# Patient Record
Sex: FEMALE
[De-identification: names, ages, dates, MRNs, and addresses within clinical notes are randomized per-mention and may not be internally consistent; named-entity substitution may affect disease eponyms.]

---

## 2023-03-30 ENCOUNTER — NURSE TRIAGE (OUTPATIENT)
Dept: OTHER | Facility: CLINIC | Age: 28
End: 2023-03-30

## 2023-03-31 NOTE — TELEPHONE ENCOUNTER
Location of patient: [place]    Subjective: Caller states \"I've been stressed out and I have a really bad headache. My body is having reactions towards me being stressed. I can't really sleep. I made something to eat but I feel sick. I've been feeling like this for a couple of days but it's getting worse. Current Symptoms: headache left side towards to front     Onset:  2-3 days     Associated Symptoms: increased wakefulness    Pain Severity: 9/10; sharp; pounding;  constant    Temperature: NA     What has been tried: nap     LMP:  unknown  Pregnant: Unknown    Recommended disposition: Go to ED Now    Care advice provided, patient verbalizes understanding; denies any other questions or concerns; instructed to call back for any new or worsening symptoms. Patient/caller agrees to proceed to local Emergency Department    This triage is a result of a call to [address] [place] [address]. Please do not respond to the triage nurse through this encounter. Any subsequent communication should be directly with the patient.     Reason for Disposition   Severe pain in one eye    Protocols used: Headache-ADULT-

## 2023-06-13 LAB
CALCIDIOL (25 OH VITAMIN D3) (NG/ML) IN SER/PLAS: 15 NG/ML
HEPATITIS B VIRUS SURFACE AG PRESENCE IN SERUM: NONREACTIVE
HEPATITIS C VIRUS AB PRESENCE IN SERUM: NONREACTIVE
HIV 1/ 2 AG/AB SCREEN: NONREACTIVE
SYPHILIS TOTAL AB: NONREACTIVE

## 2023-06-14 LAB
CHLAMYDIA TRACH., AMPLIFIED: NEGATIVE
N. GONORRHEA, AMPLIFIED: NEGATIVE
TRICHOMONAS VAGINALIS: NEGATIVE

## 2023-06-16 LAB
CLUE CELLS: PRESENT
NUGENT SCORE: 7
YEAST: ABNORMAL

## 2023-09-15 LAB — CALCIDIOL (25 OH VITAMIN D3) (NG/ML) IN SER/PLAS: 28 NG/ML

## 2023-10-19 PROBLEM — N87.1 CERVICAL INTRAEPITHELIAL NEOPLASIA II: Status: ACTIVE | Noted: 2023-10-19

## 2023-10-19 PROBLEM — E55.9 VITAMIN D DEFICIENCY: Status: ACTIVE | Noted: 2023-10-19

## 2023-10-19 PROBLEM — F41.1 GAD (GENERALIZED ANXIETY DISORDER): Status: ACTIVE | Noted: 2023-10-19

## 2023-10-19 PROBLEM — M54.50 CHRONIC MIDLINE LOW BACK PAIN: Status: ACTIVE | Noted: 2023-10-19

## 2023-10-19 PROBLEM — R21 RASH OF UNKNOWN CAUSE: Status: ACTIVE | Noted: 2023-10-19

## 2023-10-19 PROBLEM — T73.0XXA: Status: ACTIVE | Noted: 2023-10-19

## 2023-10-19 PROBLEM — R87.612 PAPANICOLAOU SMEAR OF CERVIX WITH LOW GRADE SQUAMOUS INTRAEPITHELIAL LESION (LGSIL): Status: ACTIVE | Noted: 2023-10-19

## 2023-10-19 PROBLEM — G89.29 CHRONIC MIDLINE LOW BACK PAIN: Status: ACTIVE | Noted: 2023-10-19

## 2023-10-19 PROBLEM — B00.9 HSV-2 INFECTION: Status: ACTIVE | Noted: 2023-10-19

## 2023-10-19 RX ORDER — ONDANSETRON 4 MG/1
4 TABLET, ORALLY DISINTEGRATING ORAL 3 TIMES DAILY PRN
COMMUNITY
Start: 2019-12-14 | End: 2023-11-27 | Stop reason: HOSPADM

## 2023-10-19 RX ORDER — VALACYCLOVIR HYDROCHLORIDE 1 G/1
1000 TABLET, FILM COATED ORAL DAILY
COMMUNITY

## 2023-10-19 RX ORDER — CALCIUM CARBONATE 200(500)MG
1 TABLET,CHEWABLE ORAL EVERY 4 HOURS PRN
COMMUNITY
Start: 2019-09-04 | End: 2023-11-27 | Stop reason: HOSPADM

## 2023-10-19 RX ORDER — LEVONORGESTREL 52 MG/1
INTRAUTERINE DEVICE INTRAUTERINE
COMMUNITY

## 2023-10-19 RX ORDER — KETOCONAZOLE 20 MG/ML
1 SHAMPOO, SUSPENSION TOPICAL
COMMUNITY
Start: 2016-06-02 | End: 2023-12-04

## 2023-10-19 RX ORDER — MAG HYDROX/ALUMINUM HYD/SIMETH 200-200-20
SUSPENSION, ORAL (FINAL DOSE FORM) ORAL 2 TIMES DAILY
COMMUNITY
Start: 2020-09-16

## 2023-10-19 RX ORDER — FAMOTIDINE 20 MG/1
20 TABLET, FILM COATED ORAL 2 TIMES DAILY
COMMUNITY
Start: 2019-09-04 | End: 2023-11-27 | Stop reason: HOSPADM

## 2023-11-27 ENCOUNTER — OFFICE VISIT (OUTPATIENT)
Dept: OBSTETRICS AND GYNECOLOGY | Facility: CLINIC | Age: 28
End: 2023-11-27
Payer: COMMERCIAL

## 2023-11-27 VITALS — BODY MASS INDEX: 30 KG/M2 | HEART RATE: 83 BPM | WEIGHT: 164 LBS

## 2023-11-27 DIAGNOSIS — N87.1 CERVICAL INTRAEPITHELIAL NEOPLASIA II: Primary | ICD-10-CM

## 2023-11-27 LAB — PREGNANCY TEST URINE, POC: NEGATIVE

## 2023-11-27 PROCEDURE — 1036F TOBACCO NON-USER: CPT | Performed by: STUDENT IN AN ORGANIZED HEALTH CARE EDUCATION/TRAINING PROGRAM

## 2023-11-27 PROCEDURE — 57460 BX OF CERVIX W/SCOPE LEEP: CPT | Performed by: STUDENT IN AN ORGANIZED HEALTH CARE EDUCATION/TRAINING PROGRAM

## 2023-11-27 PROCEDURE — 88307 TISSUE EXAM BY PATHOLOGIST: CPT | Mod: TC,SUR | Performed by: STUDENT IN AN ORGANIZED HEALTH CARE EDUCATION/TRAINING PROGRAM

## 2023-11-27 PROCEDURE — 57460 BX OF CERVIX W/SCOPE LEEP: CPT | Mod: GC | Performed by: STUDENT IN AN ORGANIZED HEALTH CARE EDUCATION/TRAINING PROGRAM

## 2023-11-27 PROCEDURE — 88305 TISSUE EXAM BY PATHOLOGIST: CPT | Performed by: STUDENT IN AN ORGANIZED HEALTH CARE EDUCATION/TRAINING PROGRAM

## 2023-11-27 PROCEDURE — 88307 TISSUE EXAM BY PATHOLOGIST: CPT | Performed by: STUDENT IN AN ORGANIZED HEALTH CARE EDUCATION/TRAINING PROGRAM

## 2023-11-27 ASSESSMENT — PAIN SCALES - GENERAL: PAINLEVEL: 0-NO PAIN

## 2023-11-27 NOTE — PROGRESS NOTES
Subjective   Patient ID: Scout Jim is a 28 y.o. female who presents for Abnormal Pap Smear (Pt in office for LEEP/LMP/Last pap 6/13/2023 [facility]/Chaperone declined)    29 yo F presents for LEEP. Denies complaints.    Prior history as follows:  7/2023 COLPO - AKUA II @ 8 o'clock  6/2023 PAP - [facility]  2019    PAP - [facility]ILM    Smoking status: ex-smoker  Contraception: LNG IUD  HPV vaccination: yes  HIV status: is HIV negative    Physical Exam  Constitutional:       Appearance: Normal appearance. She is normal weight.   HENT:      Head: Normocephalic and atraumatic.      Right Ear: External ear normal.      Left Ear: External ear normal.      Nose: Nose normal.      Mouth/Throat:      Mouth: Mucous membranes are moist.   Eyes:      Extraocular Movements: Extraocular movements intact.   Cardiovascular:      Rate and Rhythm: Normal rate.   Pulmonary:      Effort: Pulmonary effort is normal.   Abdominal:      General: Abdomen is flat.   Genitourinary:     General: Normal vulva.      Rectum: Normal.         Musculoskeletal:         General: Normal range of motion.   Skin:     General: Skin is warm and dry.   Neurological:      General: No focal deficit present.      Mental Status: She is alert.   Psychiatric:         Mood and Affect: Mood normal.         Behavior: Behavior normal.     Colposcopy    Date/Time: 11/27/2023 1:31 PM    Performed by: Jairo Liu MD  Authorized by: Ruma Rivera MD    Consent:     Patient questions answered: yes      Risks and benefits of the procedure and its alternatives discussed: yes      Consent obtained:  Verbal    Consent given by:  Patient  Indication:     Other indication(s): clinical abnormality    Pre-procedure:     Speculum was placed in the vagina: yes      Prep solution(s): Betadine      Local anesthetic:  Lidocaine 1% w/o epi    Total amount used (mL):  20  Procedure:     Colposcopy with: LEEP      Cervix visibility: fully visualized      SCJ visibility: fully visualized   "    Lesion visualized: fully visualized      Acetowhite lesion(s): cervix      Under satisfactory analgesia the patient was prepped and draped in the dorsal lithotomy position: yes      Intracervical block was performed: yes      Speculum meets coating standards for use with lasers: yes      Electrocautery: pure cut      Cautery loop used: yes      Cautery loop depth of excision (mm):  12    Cautery loop radius of excision (mm):  12    Top Hat performed: no      Endometrial sampling: curettage      Ball cautery with coag current performed: no      LEEP details:  LEEP performed with IUD in place. First segment (Labeled \"1\" in Physical exam above) taken from lateral edge of right cervix with care taken to include prior biopsy site. Excision was terminated upon reaching IUD strings at 1 o'clock. IUD strings were then swept out of the way and the excision completed.    An endocervical curettage was then performed.    Ferric subsulfate solution applied: yes    Post-procedure:     Patient tolerance of procedure:  Patient tolerated the procedure well with no immediate complications    Assessment/Plan  LEEP obtained without complication   Colposcopic impression:   Joshua index  Color 1  Margins 0  Vessels 1  Iodine staining 0  Total 1, consistent with AKUA I  Follow-up surgical path    Seen and discussed with Dr. Miguel Liu MD  Obstetrics and Gynecology  "

## 2023-12-04 ENCOUNTER — OFFICE VISIT (OUTPATIENT)
Dept: OBSTETRICS AND GYNECOLOGY | Facility: CLINIC | Age: 28
End: 2023-12-04
Payer: COMMERCIAL

## 2023-12-04 VITALS
HEART RATE: 88 BPM | HEIGHT: 61 IN | DIASTOLIC BLOOD PRESSURE: 57 MMHG | BODY MASS INDEX: 31.26 KG/M2 | WEIGHT: 165.6 LBS | SYSTOLIC BLOOD PRESSURE: 109 MMHG

## 2023-12-04 DIAGNOSIS — N87.1 CERVICAL INTRAEPITHELIAL NEOPLASIA II: Primary | ICD-10-CM

## 2023-12-04 PROCEDURE — 99212 OFFICE O/P EST SF 10 MIN: CPT | Mod: GC | Performed by: STUDENT IN AN ORGANIZED HEALTH CARE EDUCATION/TRAINING PROGRAM

## 2023-12-04 PROCEDURE — 99212 OFFICE O/P EST SF 10 MIN: CPT | Performed by: STUDENT IN AN ORGANIZED HEALTH CARE EDUCATION/TRAINING PROGRAM

## 2023-12-04 PROCEDURE — 1036F TOBACCO NON-USER: CPT | Performed by: STUDENT IN AN ORGANIZED HEALTH CARE EDUCATION/TRAINING PROGRAM

## 2023-12-04 RX ORDER — TRIAMCINOLONE ACETONIDE 1 MG/G
CREAM TOPICAL
COMMUNITY
Start: 2023-10-04

## 2023-12-04 RX ORDER — CLOTRIMAZOLE 1 %
CREAM (GRAM) TOPICAL
COMMUNITY
Start: 2023-09-29

## 2023-12-04 RX ORDER — METRONIDAZOLE 7.5 MG/G
GEL VAGINAL
COMMUNITY
Start: 2022-05-11 | End: 2023-12-07 | Stop reason: ALTCHOICE

## 2023-12-04 ASSESSMENT — ENCOUNTER SYMPTOMS
PSYCHIATRIC NEGATIVE: 0
GASTROINTESTINAL NEGATIVE: 0
MUSCULOSKELETAL NEGATIVE: 0
CARDIOVASCULAR NEGATIVE: 0
EYES NEGATIVE: 0
NEUROLOGICAL NEGATIVE: 0
RESPIRATORY NEGATIVE: 0
CONSTITUTIONAL NEGATIVE: 0
ALLERGIC/IMMUNOLOGIC NEGATIVE: 0
ENDOCRINE NEGATIVE: 0
HEMATOLOGIC/LYMPHATIC NEGATIVE: 0

## 2023-12-04 ASSESSMENT — PAIN SCALES - GENERAL: PAINLEVEL: 8

## 2023-12-04 NOTE — PROGRESS NOTES
Subjective   Patient ID: Scout Jim is a 28 y.o. female who presents for FOLLOW UP FROM LEEP (Patient states her lower back and abdominal are hurting, patient decline the flu and Covid shots this visit. No sti checking today.  )    Feeling some cramping, only needing to take tylenol occasionally. Some brown discharge. Overall doing well, no concerns     Prior history as follows:  7/2023 COLPO - AKUA II @ 8 o'clock  6/2023 PAP - [facility]  2019    PAP - NILM    Smoking status: ex-smoker  Contraception: LNG IUD  HPV vaccination: yes  HIV status: is HIV negative    Review of Systems   All other systems reviewed and are negative.     Gen: NAD  HEENT: NCAT  Resp: normal work of breathing on room air  Card: regular rate  Neuro: grossly intact   Psych: approipriate  Motor: moving all extremities spontaneously   Abdomen: Non distended      Assessment/Plan   LEEP follow up  - recovering well  - RTC in 4 weeks for exam and discussion of path results     Pt seen and dw Dr Miguel Quiñonez MD  OB/GYN PGY3

## 2023-12-04 NOTE — PROGRESS NOTES
I saw and evaluated the patient. I personally obtained the key and critical portions of the history and physical exam or was physically present for key and critical portions performed by the resident/fellow. I reviewed the resident/fellow's documentation and discussed the patient with the resident/fellow. I agree with the resident/fellow's medical decision making as documented in the note.    Ruma Rivera MD

## 2023-12-05 LAB
LABORATORY COMMENT REPORT: NORMAL
PATH REPORT.FINAL DX SPEC: NORMAL
PATH REPORT.GROSS SPEC: NORMAL
PATH REPORT.RELEVANT HX SPEC: NORMAL
PATH REPORT.TOTAL CANCER: NORMAL

## 2023-12-07 ENCOUNTER — TELEPHONE (OUTPATIENT)
Dept: OBSTETRICS AND GYNECOLOGY | Facility: CLINIC | Age: 28
End: 2023-12-07
Payer: COMMERCIAL

## 2023-12-07 ENCOUNTER — OFFICE VISIT (OUTPATIENT)
Dept: OBSTETRICS AND GYNECOLOGY | Facility: CLINIC | Age: 28
End: 2023-12-07
Payer: COMMERCIAL

## 2023-12-07 VITALS
WEIGHT: 165.9 LBS | DIASTOLIC BLOOD PRESSURE: 69 MMHG | HEART RATE: 97 BPM | SYSTOLIC BLOOD PRESSURE: 137 MMHG | BODY MASS INDEX: 31.35 KG/M2

## 2023-12-07 DIAGNOSIS — B96.89 BACTERIAL VAGINOSIS: Primary | ICD-10-CM

## 2023-12-07 DIAGNOSIS — N76.0 BACTERIAL VAGINOSIS: Primary | ICD-10-CM

## 2023-12-07 DIAGNOSIS — Z98.890 STATUS POST LEEP (LOOP ELECTROSURGICAL EXCISION PROCEDURE) OF CERVIX: ICD-10-CM

## 2023-12-07 DIAGNOSIS — N93.9 VAGINAL BLEEDING: Primary | ICD-10-CM

## 2023-12-07 PROCEDURE — 1036F TOBACCO NON-USER: CPT | Performed by: STUDENT IN AN ORGANIZED HEALTH CARE EDUCATION/TRAINING PROGRAM

## 2023-12-07 PROCEDURE — 99214 OFFICE O/P EST MOD 30 MIN: CPT | Mod: GC | Performed by: STUDENT IN AN ORGANIZED HEALTH CARE EDUCATION/TRAINING PROGRAM

## 2023-12-07 PROCEDURE — 99214 OFFICE O/P EST MOD 30 MIN: CPT | Performed by: STUDENT IN AN ORGANIZED HEALTH CARE EDUCATION/TRAINING PROGRAM

## 2023-12-07 RX ORDER — METRONIDAZOLE 500 MG/1
500 TABLET ORAL 2 TIMES DAILY
Qty: 10 TABLET | Refills: 0 | Status: SHIPPED | OUTPATIENT
Start: 2023-12-07 | End: 2023-12-12

## 2023-12-07 ASSESSMENT — ENCOUNTER SYMPTOMS
ENDOCRINE NEGATIVE: 0
NEUROLOGICAL NEGATIVE: 0
RESPIRATORY NEGATIVE: 0
HEMATOLOGIC/LYMPHATIC NEGATIVE: 0
PSYCHIATRIC NEGATIVE: 0
CARDIOVASCULAR NEGATIVE: 0
GASTROINTESTINAL NEGATIVE: 0
CONSTITUTIONAL NEGATIVE: 0
ALLERGIC/IMMUNOLOGIC NEGATIVE: 0
MUSCULOSKELETAL NEGATIVE: 0
EYES NEGATIVE: 0

## 2023-12-07 ASSESSMENT — PAIN SCALES - GENERAL: PAINLEVEL: 0-NO PAIN

## 2023-12-07 NOTE — PROGRESS NOTES
Subjective   Patient ID: Scout Jim is a 28 y.o. female who presents for Vaginal Bleeding (Patient been having some light headiness.no sti checking. Patient decline flu and covid shot this visit.  ).  Vaginal Bleeding     Patient presenting for VB s/p LEEP procedure on 11/27. Had had minimal bleeding up until 2 days ago when she started passing clots in the toilet. State prior to the LEEP her VB had been minimal with IUD in place. Denies HA dizziness, fevers or chills    Review of Systems   Genitourinary:  Positive for vaginal bleeding.   All other systems reviewed and are negative.      Objective   Physical Exam  Vitals and nursing note reviewed.   Constitutional:       Appearance: Normal appearance.   HENT:      Head: Normocephalic and atraumatic.      Nose: Nose normal.      Mouth/Throat:      Mouth: Mucous membranes are moist.   Eyes:      Extraocular Movements: Extraocular movements intact.      Conjunctiva/sclera: Conjunctivae normal.   Pulmonary:      Effort: Pulmonary effort is normal.   Chest:      Chest wall: No deformity or swelling.   Breasts:     Breasts are symmetrical.      Right: Normal.      Left: Normal.   Abdominal:      General: There is no distension.      Palpations: Abdomen is soft. There is no mass.      Tenderness: There is no abdominal tenderness.   Genitourinary:     General: Normal vulva.      Vagina: Normal.      Cervix: Normal.      Uterus: Normal.       Adnexa: Right adnexa normal and left adnexa normal.      Rectum: Normal.      Comments: 3cc dark red clot near cervical os, IUD strings visualized  Musculoskeletal:      Cervical back: Normal range of motion.   Skin:     General: Skin is warm and dry.   Neurological:      General: No focal deficit present.      Mental Status: She is alert.   Psychiatric:         Mood and Affect: Mood normal.         Behavior: Behavior normal.         Thought Content: Thought content normal.         Judgment: Judgment normal.          Assessment/Plan   Vaginal Bleeding  -s/p LEEP  -Additional Monsel solution applied to the LEEP with improvement in bleeding  -Continue to monitor, precautions provided    D/w Dr. Jr Hadley MD PGY-3

## 2023-12-07 NOTE — TELEPHONE ENCOUNTER
----- Message from Scout Smyth sent at 12/7/2023 12:14 AM EST -----  Regarding: urgent colposcopy question:  Contact: [phone]  Hi, good afternoon my name is Scout smyth. And I’ve recently had a Colposcopy procedure done, and I’m having trouble with extreme foul odor. And I feel uncomfortable when I go out into public to do normal activities due to my cervix being in the healing process with brown and yellow color discharge already. I’ve also had bacteria vaginosis before the procedure, around that time I was taking metronidazole and I believe it has came back. Is there any luck as far as a biotic to help? I’ve also have spoken with a nurse to see if I am able to get any thing to help. She said she will reach out to my doctor and then she will call me back with an update. My phone number is [phone]. I have a new pharmacy as well. Thank you.

## 2023-12-07 NOTE — TELEPHONE ENCOUNTER
Pt calling stating her bleeding has increased and she is on mirena and usually doesn't have anything but spotting. Pt denies having sex or using anything in vagina. Pt states she went through 3 panti liners in last hour and also passed a few quarter size clots. Discussed with Dr Hadley and he said to add her to his schedule. Pt given apt for 3 pm

## 2023-12-07 NOTE — TELEPHONE ENCOUNTER
Spoke to pt yesterday and sent message to Dr Rivera about pt requesting metronidazole. Dr Rivera sent prescription and Pt notified that prescription sent

## 2023-12-08 NOTE — PROGRESS NOTES
I saw and evaluated the patient. I personally obtained the key and critical portions of the history and physical exam or was physically present for key and critical portions performed by the resident/fellow. I reviewed the resident/fellow's documentation and discussed the patient with the resident/fellow. I agree with the resident/fellow's medical decision making as documented in the note.    Katina Norris MD

## 2023-12-13 ENCOUNTER — TELEPHONE (OUTPATIENT)
Dept: OBSTETRICS AND GYNECOLOGY | Facility: CLINIC | Age: 28
End: 2023-12-13
Payer: COMMERCIAL

## 2023-12-13 NOTE — TELEPHONE ENCOUNTER
----- Message from Garima Levi MD sent at 12/11/2023  2:56 PM EST -----  6 month follow up, can you notify patient

## 2023-12-27 ENCOUNTER — TELEPHONE (OUTPATIENT)
Dept: OBSTETRICS AND GYNECOLOGY | Facility: CLINIC | Age: 28
End: 2023-12-27
Payer: COMMERCIAL

## 2023-12-27 NOTE — TELEPHONE ENCOUNTER
PT is being treated for BV and asking is she could use the gel  due to difficulty of taking pills but once pt opened the  pill container up and saw the size of the pill she sais she could take them.  Advised pt try take the pill with applesauce  and if unable to take it to call  back, Rx was sent 12/7/23

## 2024-01-09 ENCOUNTER — TELEPHONE (OUTPATIENT)
Dept: OBSTETRICS AND GYNECOLOGY | Facility: CLINIC | Age: 29
End: 2024-01-09
Payer: COMMERCIAL

## 2024-01-09 NOTE — TELEPHONE ENCOUNTER
Pt rescheduled for missed leep follow up pt called yesterday to reschedule as she was not feeling well. She reports swollen lymph nodes today. Advised she may be seen for urgent care visit if needed and pt is rescheduled for next Monday

## 2024-01-15 ENCOUNTER — APPOINTMENT (OUTPATIENT)
Dept: OBSTETRICS AND GYNECOLOGY | Facility: CLINIC | Age: 29
End: 2024-01-15
Payer: COMMERCIAL

## 2024-02-22 ENCOUNTER — OFFICE VISIT (OUTPATIENT)
Dept: OBSTETRICS AND GYNECOLOGY | Facility: CLINIC | Age: 29
End: 2024-02-22
Payer: COMMERCIAL

## 2024-02-22 VITALS
BODY MASS INDEX: 31.53 KG/M2 | SYSTOLIC BLOOD PRESSURE: 110 MMHG | WEIGHT: 167 LBS | HEIGHT: 61 IN | DIASTOLIC BLOOD PRESSURE: 70 MMHG

## 2024-02-22 DIAGNOSIS — Z11.3 SCREEN FOR STD (SEXUALLY TRANSMITTED DISEASE): ICD-10-CM

## 2024-02-22 DIAGNOSIS — N76.0 ACUTE VAGINITIS: Primary | ICD-10-CM

## 2024-02-22 LAB
POC CLUE CELL WET PREP: NORMAL
POC TRICHOMONAS WET PREP: NORMAL
POC WBC WET PREP: NORMAL
POC YEAST CELLS WET PREP: NORMAL

## 2024-02-22 PROCEDURE — 99214 OFFICE O/P EST MOD 30 MIN: CPT | Performed by: OBSTETRICS & GYNECOLOGY

## 2024-02-22 PROCEDURE — 87800 DETECT AGNT MULT DNA DIREC: CPT

## 2024-02-22 PROCEDURE — 87661 TRICHOMONAS VAGINALIS AMPLIF: CPT

## 2024-02-22 PROCEDURE — 87210 SMEAR WET MOUNT SALINE/INK: CPT | Performed by: OBSTETRICS & GYNECOLOGY

## 2024-02-22 PROCEDURE — 1036F TOBACCO NON-USER: CPT | Performed by: OBSTETRICS & GYNECOLOGY

## 2024-02-22 PROCEDURE — 87205 SMEAR GRAM STAIN: CPT

## 2024-02-22 RX ORDER — METRONIDAZOLE 500 MG/1
500 TABLET ORAL 2 TIMES DAILY
Qty: 14 TABLET | Refills: 0 | Status: SHIPPED | OUTPATIENT
Start: 2024-02-22 | End: 2024-02-29

## 2024-02-22 ASSESSMENT — PAIN SCALES - GENERAL: PAINLEVEL: 0-NO PAIN

## 2024-02-22 NOTE — PROGRESS NOTES
Subjective   Patient ID: Scout Jim is a 28 y.o. female who presents for Vaginitis/Bacterial Vaginosis (Vaginal odor ).  Patient here complaining of 1 week history of vaginal irritation and vaginal odor.  Trace clear discharge.  Denies any external lesions.  Patient sexually active with steady partner.  Denies nausea vomiting fever chills no GI or  complaints    Review of Systems  All systems negative except for vaginal discharge and vaginal odor    Objective   Physical Exam  Vitals reviewed.   Constitutional:       Appearance: Normal appearance. She is normal weight.   Cardiovascular:      Rate and Rhythm: Normal rate and regular rhythm.   Pulmonary:      Effort: Pulmonary effort is normal.      Breath sounds: Normal breath sounds.   Abdominal:      General: Abdomen is flat. Bowel sounds are normal.      Palpations: Abdomen is soft.   Genitourinary:     Comments: External genitalia unremarkable no lesions  Vagina shows trace discharge wet mount positive clue cells  Cervix uterus normal  Adnexa unremarkable  Perineum without lesions or inflammation  Neurological:      General: No focal deficit present.      Mental Status: She is alert.   Psychiatric:         Mood and Affect: Mood normal.         Behavior: Behavior normal.         Thought Content: Thought content normal.         Judgment: Judgment normal.         Assessment/Plan   Diagnoses and all orders for this visit:  Acute vaginitis  -     C. Trachomatis / N. Gonorrhoeae, Amplified Detection  -     Hepatitis B Surface Antigen; Future  -     Hepatitis C Antibody; Future  -     HIV 1/2 Antigen/Antibody Screen with Reflex to Confirmation; Future  -     Syphilis Screen with Reflex; Future  -     Trichomonas vaginalis, Nucleic Acid Detection  -     Vaginitis Gram Stain For Bacterial Vaginosis + Yeast  -     metroNIDAZOLE (Flagyl) 500 mg tablet; Take 1 tablet (500 mg) by mouth 2 times a day for 7 days.  -     POCT Wet Mount manually resulted  Screen for STD  (sexually transmitted disease)  -     C. Trachomatis / N. Gonorrhoeae, Amplified Detection  -     Hepatitis B Surface Antigen; Future  -     Hepatitis C Antibody; Future  -     HIV 1/2 Antigen/Antibody Screen with Reflex to Confirmation; Future  -     Syphilis Screen with Reflex; Future  -     Trichomonas vaginalis, Nucleic Acid Detection  -     Vaginitis Gram Stain For Bacterial Vaginosis + Yeast  -     metroNIDAZOLE (Flagyl) 500 mg tablet; Take 1 tablet (500 mg) by mouth 2 times a day for 7 days.  -     POCT Wet Mount manually resulted     Will start patient on Flagyl for now.  Wet mount seems to indicate probable bacterial vaginitis.  Will reevaluate when cultures are back.  Chart reviewed    James Brooke MD 02/22/24 2:14 PM

## 2024-02-23 ENCOUNTER — OFFICE VISIT (OUTPATIENT)
Dept: OBSTETRICS AND GYNECOLOGY | Facility: CLINIC | Age: 29
End: 2024-02-23
Payer: COMMERCIAL

## 2024-02-23 ENCOUNTER — TELEPHONE (OUTPATIENT)
Dept: OBSTETRICS AND GYNECOLOGY | Facility: CLINIC | Age: 29
End: 2024-02-23
Payer: COMMERCIAL

## 2024-02-23 DIAGNOSIS — A54.9 GONORRHEA: ICD-10-CM

## 2024-02-23 LAB
BACTERIAL VAGINOSIS VAG-IMP: ABNORMAL
C TRACH RRNA SPEC QL NAA+PROBE: NEGATIVE
CLUE CELLS VAG LPF-#/AREA: PRESENT /[LPF]
N GONORRHOEA DNA SPEC QL PROBE+SIG AMP: POSITIVE
NUGENT SCORE: 6
T VAGINALIS RRNA SPEC QL NAA+PROBE: NEGATIVE
YEAST VAG WET PREP-#/AREA: ABNORMAL

## 2024-02-23 PROCEDURE — 1036F TOBACCO NON-USER: CPT | Performed by: ADVANCED PRACTICE MIDWIFE

## 2024-02-23 PROCEDURE — 96372 THER/PROPH/DIAG INJ SC/IM: CPT | Performed by: ADVANCED PRACTICE MIDWIFE

## 2024-02-23 PROCEDURE — 99212 OFFICE O/P EST SF 10 MIN: CPT | Performed by: ADVANCED PRACTICE MIDWIFE

## 2024-02-23 RX ORDER — CEFTRIAXONE 500 MG/1
500 INJECTION, POWDER, FOR SOLUTION INTRAMUSCULAR; INTRAVENOUS ONCE
Status: COMPLETED | OUTPATIENT
Start: 2024-02-23 | End: 2024-02-23

## 2024-02-23 RX ADMIN — CEFTRIAXONE 500 MG: 500 INJECTION, POWDER, FOR SOLUTION INTRAMUSCULAR; INTRAVENOUS at 14:15

## 2024-02-23 NOTE — RESULT ENCOUNTER NOTE
Positive gonorrhea.  Needs to come in to the office for Rocephin 500 mg IM.  She should advise her partner to seek treatment.

## 2024-02-23 NOTE — TELEPHONE ENCOUNTER
Name/ verified  Called and notified Pt that she has been diagnosed with a sexually transmitted infection Gonorrhea.    Pt needs to come to office for Rocephin injection. Scheduled for today at 1:15pm  Encouraged partner treatment.  Please use condoms to prevent STIs.  Pt verbalized understanding.

## 2024-02-23 NOTE — PROGRESS NOTES
Pt is here for Ceftriaxone injection.  Ceftriaxone 500 mg was mixed with 1.8ml NS and given IM in right ventro gluteal for +Gonorrhea  Lot # [id number], Exp date 7/2025 NDC 5178-0449-34  Pt tolerated well.  Patient was instructed to abstain from intercourse for 7-10 days  and until after partner is treated. STD prevention discussed.   Provider Yasemin Torres CNM was present in office at the time of the injection.  Denies any further questions and pt verbalized understanding.

## 2024-05-24 ENCOUNTER — APPOINTMENT (OUTPATIENT)
Dept: OBSTETRICS AND GYNECOLOGY | Facility: CLINIC | Age: 29
End: 2024-05-24
Payer: COMMERCIAL

## 2024-05-31 ENCOUNTER — APPOINTMENT (OUTPATIENT)
Dept: OBSTETRICS AND GYNECOLOGY | Facility: HOSPITAL | Age: 29
End: 2024-05-31
Payer: COMMERCIAL

## 2024-06-04 ENCOUNTER — APPOINTMENT (OUTPATIENT)
Dept: OBSTETRICS AND GYNECOLOGY | Facility: CLINIC | Age: 29
End: 2024-06-04
Payer: COMMERCIAL

## 2024-06-05 ENCOUNTER — APPOINTMENT (OUTPATIENT)
Dept: OBSTETRICS AND GYNECOLOGY | Facility: CLINIC | Age: 29
End: 2024-06-05
Payer: COMMERCIAL

## 2024-06-06 ENCOUNTER — APPOINTMENT (OUTPATIENT)
Dept: OBSTETRICS AND GYNECOLOGY | Facility: HOSPITAL | Age: 29
End: 2024-06-06
Payer: COMMERCIAL

## 2024-06-06 ENCOUNTER — PROCEDURE VISIT (OUTPATIENT)
Dept: OBSTETRICS AND GYNECOLOGY | Facility: CLINIC | Age: 29
End: 2024-06-06
Payer: COMMERCIAL

## 2024-06-06 VITALS
BODY MASS INDEX: 32.36 KG/M2 | SYSTOLIC BLOOD PRESSURE: 142 MMHG | DIASTOLIC BLOOD PRESSURE: 74 MMHG | HEIGHT: 61 IN | WEIGHT: 171.4 LBS

## 2024-06-06 DIAGNOSIS — Z30.432 ENCOUNTER FOR IUD REMOVAL: Primary | ICD-10-CM

## 2024-06-06 DIAGNOSIS — Z30.431 INTRAUTERINE DEVICE SURVEILLANCE: ICD-10-CM

## 2024-06-06 DIAGNOSIS — N87.1 CIN II (CERVICAL INTRAEPITHELIAL NEOPLASIA II): ICD-10-CM

## 2024-06-06 PROCEDURE — 99213 OFFICE O/P EST LOW 20 MIN: CPT | Performed by: STUDENT IN AN ORGANIZED HEALTH CARE EDUCATION/TRAINING PROGRAM

## 2024-06-06 ASSESSMENT — ENCOUNTER SYMPTOMS
MUSCULOSKELETAL NEGATIVE: 0
ENDOCRINE NEGATIVE: 0
HEMATOLOGIC/LYMPHATIC NEGATIVE: 0
PSYCHIATRIC NEGATIVE: 0
RESPIRATORY NEGATIVE: 0
CONSTITUTIONAL NEGATIVE: 0
GASTROINTESTINAL NEGATIVE: 0
EYES NEGATIVE: 0
CARDIOVASCULAR NEGATIVE: 0
NEUROLOGICAL NEGATIVE: 0
ALLERGIC/IMMUNOLOGIC NEGATIVE: 0

## 2024-06-06 ASSESSMENT — PATIENT HEALTH QUESTIONNAIRE - PHQ9
SUM OF ALL RESPONSES TO PHQ9 QUESTIONS 1 AND 2: 0
2. FEELING DOWN, DEPRESSED OR HOPELESS: NOT AT ALL
1. LITTLE INTEREST OR PLEASURE IN DOING THINGS: NOT AT ALL

## 2024-06-06 ASSESSMENT — PAIN SCALES - GENERAL: PAINLEVEL: 0-NO PAIN

## 2024-06-18 ENCOUNTER — APPOINTMENT (OUTPATIENT)
Dept: OBSTETRICS AND GYNECOLOGY | Facility: CLINIC | Age: 29
End: 2024-06-18
Payer: COMMERCIAL

## 2024-06-18 DIAGNOSIS — Z31.69 PRE-CONCEPTION COUNSELING: Primary | ICD-10-CM

## 2024-06-18 PROCEDURE — 99212 OFFICE O/P EST SF 10 MIN: CPT | Performed by: STUDENT IN AN ORGANIZED HEALTH CARE EDUCATION/TRAINING PROGRAM

## 2024-06-18 ASSESSMENT — ENCOUNTER SYMPTOMS
RESPIRATORY NEGATIVE: 0
ENDOCRINE NEGATIVE: 0
DYSURIA: 0
FLATUS: 0
HEADACHES: 0
ALLERGIC/IMMUNOLOGIC NEGATIVE: 0
VOMITING: 0
ARTHRALGIAS: 0
DIARRHEA: 0
HEMATURIA: 0
WEIGHT LOSS: 0
HEMATOLOGIC/LYMPHATIC NEGATIVE: 0
MYALGIAS: 0
FREQUENCY: 0
ABDOMINAL PAIN: 1
PSYCHIATRIC NEGATIVE: 0
ANOREXIA: 0
MUSCULOSKELETAL NEGATIVE: 0
EYES NEGATIVE: 0
CONSTITUTIONAL NEGATIVE: 0
HEMATOCHEZIA: 0
FEVER: 0
CARDIOVASCULAR NEGATIVE: 0
NEUROLOGICAL NEGATIVE: 0
CONSTIPATION: 0
NAUSEA: 0
BELCHING: 0
GASTROINTESTINAL NEGATIVE: 0

## 2024-06-18 NOTE — PROGRESS NOTES
Subjective   Patient ID: Scout Jim is a 28 y.o. female who presents for Abdominal Pain (Abdominal pain and to discuss contraceptive last pap 6/6/24 wnl).  Patient here for counseling about achieve conception.  No acute complaints.        Review of Systems   All other systems reviewed and are negative.      Objective   Physical Exam  Vitals reviewed.   Constitutional:       General: She is not in acute distress.     Appearance: She is not ill-appearing.   Pulmonary:      Effort: Pulmonary effort is normal.   Skin:     Coloration: Skin is not pale.   Neurological:      Mental Status: She is alert.         Assessment/Plan   Diagnoses and all orders for this visit:  Pre-conception counseling    Here for preconception counseling.  Educated patient about ovulation predictor kits and timed intercourse.  Patient to follow-up as scheduled       Garcia Velazquez MD 06/18/24 4:55 PM

## 2024-06-24 ENCOUNTER — APPOINTMENT (OUTPATIENT)
Dept: OBSTETRICS AND GYNECOLOGY | Facility: CLINIC | Age: 29
End: 2024-06-24
Payer: COMMERCIAL

## 2024-06-26 PROCEDURE — 58301 REMOVE INTRAUTERINE DEVICE: CPT | Performed by: STUDENT IN AN ORGANIZED HEALTH CARE EDUCATION/TRAINING PROGRAM

## 2024-06-26 NOTE — PROGRESS NOTES
Patient ID: Scout Jim is a 28 y.o. female.    IUD Removal    Date/Time: 6/26/2024 5:36 PM    Performed by: Garcia Velazquez MD  Authorized by: Garcia Velazquez MD    Consent:     Consent obtained:  Verbal and written    Consent given by:  Patient    Procedure risks and benefits discussed: yes      Patient questions answered: yes      Patient agrees, verbalizes understanding, and wants to proceed: yes    Procedure:     Removed with no complications: yes

## 2024-07-03 ENCOUNTER — APPOINTMENT (OUTPATIENT)
Dept: OBSTETRICS AND GYNECOLOGY | Facility: CLINIC | Age: 29
End: 2024-07-03
Payer: COMMERCIAL

## 2024-07-05 ENCOUNTER — APPOINTMENT (OUTPATIENT)
Dept: OBSTETRICS AND GYNECOLOGY | Facility: CLINIC | Age: 29
End: 2024-07-05
Payer: COMMERCIAL

## 2024-07-26 ENCOUNTER — TELEPHONE (OUTPATIENT)
Dept: OBSTETRICS AND GYNECOLOGY | Facility: CLINIC | Age: 29
End: 2024-07-26

## 2024-07-26 ENCOUNTER — APPOINTMENT (OUTPATIENT)
Dept: OBSTETRICS AND GYNECOLOGY | Facility: CLINIC | Age: 29
End: 2024-07-26
Payer: COMMERCIAL

## 2024-07-31 DIAGNOSIS — Z30.432 ENCOUNTER FOR IUD REMOVAL: ICD-10-CM

## 2024-08-21 ENCOUNTER — APPOINTMENT (OUTPATIENT)
Dept: OBSTETRICS AND GYNECOLOGY | Facility: CLINIC | Age: 29
End: 2024-08-21
Payer: COMMERCIAL

## 2024-08-21 VITALS
HEIGHT: 61 IN | DIASTOLIC BLOOD PRESSURE: 64 MMHG | WEIGHT: 173 LBS | SYSTOLIC BLOOD PRESSURE: 123 MMHG | BODY MASS INDEX: 32.66 KG/M2

## 2024-08-21 DIAGNOSIS — Z34.90 PREGNANCY WITH GESTATION OF UNKNOWN DURATION (HHS-HCC): Primary | ICD-10-CM

## 2024-08-21 PROCEDURE — 87086 URINE CULTURE/COLONY COUNT: CPT

## 2024-08-21 PROCEDURE — 87591 N.GONORRHOEAE DNA AMP PROB: CPT

## 2024-08-21 PROCEDURE — 3008F BODY MASS INDEX DOCD: CPT | Performed by: STUDENT IN AN ORGANIZED HEALTH CARE EDUCATION/TRAINING PROGRAM

## 2024-08-21 PROCEDURE — 1036F TOBACCO NON-USER: CPT | Performed by: STUDENT IN AN ORGANIZED HEALTH CARE EDUCATION/TRAINING PROGRAM

## 2024-08-21 PROCEDURE — 87661 TRICHOMONAS VAGINALIS AMPLIF: CPT

## 2024-08-21 PROCEDURE — 87491 CHLMYD TRACH DNA AMP PROBE: CPT

## 2024-08-21 PROCEDURE — 99213 OFFICE O/P EST LOW 20 MIN: CPT | Performed by: STUDENT IN AN ORGANIZED HEALTH CARE EDUCATION/TRAINING PROGRAM

## 2024-08-21 RX ORDER — CALCIUM CARB/VITAMIN D3/VIT K1 500-500-40
800 TABLET,CHEWABLE ORAL DAILY
Qty: 180 CAPSULE | Refills: 3 | Status: SHIPPED | OUTPATIENT
Start: 2024-08-21 | End: 2025-08-21

## 2024-08-21 ASSESSMENT — ENCOUNTER SYMPTOMS
DEPRESSION: 0
OCCASIONAL FEELINGS OF UNSTEADINESS: 0
LOSS OF SENSATION IN FEET: 0

## 2024-08-21 ASSESSMENT — PAIN SCALES - GENERAL: PAINLEVEL: 0-NO PAIN

## 2024-08-21 NOTE — PROGRESS NOTES
Subjective   Patient ID [id number]   Scout Jim is a 28 y.o.  at Unknown with a working estimated date of delivery of Not found. who presents for an initial prenatal visit. This pregnancy is planned and desired .    Her pregnancy is complicated by:  Hx of blood transfusion 2/2 AUB as a teen    OB History    Para Term  AB Living   2 1 1     1   SAB IAB Ectopic Multiple Live Births           1      # Outcome Date GA Lbr Delio/2nd Weight Sex Type Anes PTL Lv   2 Current            1 Term 21    F Vag-Spont EPI N LUCIO          Objective   Physical Exam  Weight: 78.5 kg (173 lb)  Expected Total Weight Gain: Could not be calculated   Pregravid BMI: Could not be calculated  BP: 123/64          Physical Exam  Constitutional:       General: She is not in acute distress.     Appearance: She is not ill-appearing, toxic-appearing or diaphoretic.   Pulmonary:      Effort: Pulmonary effort is normal.   Neurological:      Mental Status: She is alert.   Psychiatric:         Mood and Affect: Mood normal.   Vitals reviewed.         Prenatal Labs  Ordered    Assessment/Plan   Diagnoses and all orders for this visit:  Pregnancy with gestation of unknown duration ([facility])  -     US MAC OB imaging order; Future  -     Hemoglobin A1C; Future  -     Type And Screen; Future  -     CBC Anemia Panel With Reflex,Pregnancy; Future  -     Hemoglobin Identification with Path Review; Future  -     Hepatitis C Antibody; Future  -     Hepatitis B Surface Antigen; Future  -     Rubella Antibody, IgG; Future  -     Syphilis Screen with Reflex; Future  -     HIV 1/2 Antigen/Antibody Screen with Reflex to Confirmation; Future  -     Urine Culture  -     Trichomonas vaginalis, Nucleic Acid Detection  -     C. Trachomatis / N. Gonorrhoeae, Amplified Detection  -     cholecalciferol (Vitamin D3) 400 unit capsule; Take 2 capsules (20 mcg) by mouth once daily.    Here for initial prenatal visit prenatal counseling  given.      Prenatal Labs ordered  Daily prenatal vitamins prescribed    Follow up in 4 weeks for return OB visit.

## 2024-08-21 NOTE — LETTER
8/21/2024    To Whom It May Concern:    Scout Jim is being followed for her pregnancy at [facility].  Estimated Date of Delivery: 4/29/2024    Sincerely,        Garcia Velazquez MD  [facility]

## 2024-08-22 LAB
BACTERIA UR CULT: NORMAL
C TRACH RRNA SPEC QL NAA+PROBE: NEGATIVE
N GONORRHOEA DNA SPEC QL PROBE+SIG AMP: NEGATIVE
T VAGINALIS RRNA SPEC QL NAA+PROBE: NEGATIVE

## 2024-08-28 ENCOUNTER — APPOINTMENT (OUTPATIENT)
Dept: OBSTETRICS AND GYNECOLOGY | Facility: CLINIC | Age: 29
End: 2024-08-28
Payer: COMMERCIAL

## 2024-09-20 ENCOUNTER — APPOINTMENT (OUTPATIENT)
Dept: OBSTETRICS AND GYNECOLOGY | Facility: CLINIC | Age: 29
End: 2024-09-20
Payer: COMMERCIAL

## 2024-11-04 ENCOUNTER — APPOINTMENT (OUTPATIENT)
Dept: OBSTETRICS AND GYNECOLOGY | Facility: CLINIC | Age: 29
End: 2024-11-04
Payer: COMMERCIAL

## 2024-11-04 DIAGNOSIS — N92.6 MISSED PERIOD: ICD-10-CM

## 2024-11-06 ENCOUNTER — APPOINTMENT (OUTPATIENT)
Dept: OBSTETRICS AND GYNECOLOGY | Facility: CLINIC | Age: 29
End: 2024-11-06
Payer: COMMERCIAL

## 2024-11-19 ENCOUNTER — APPOINTMENT (OUTPATIENT)
Dept: OBSTETRICS AND GYNECOLOGY | Facility: CLINIC | Age: 29
End: 2024-11-19
Payer: COMMERCIAL

## 2024-11-19 DIAGNOSIS — Z30.433 ENCOUNTER FOR IUD REMOVAL AND REINSERTION: Primary | ICD-10-CM

## 2024-11-19 DIAGNOSIS — Z98.890 HISTORY OF LOOP ELECTRICAL EXCISION PROCEDURE (LEEP): ICD-10-CM

## 2024-11-19 PROCEDURE — 88142 CYTOPATH C/V THIN LAYER: CPT

## 2024-11-19 PROCEDURE — 87491 CHLMYD TRACH DNA AMP PROBE: CPT

## 2024-11-19 PROCEDURE — 58300 INSERT INTRAUTERINE DEVICE: CPT | Performed by: STUDENT IN AN ORGANIZED HEALTH CARE EDUCATION/TRAINING PROGRAM

## 2024-11-19 PROCEDURE — 58301 REMOVE INTRAUTERINE DEVICE: CPT | Performed by: STUDENT IN AN ORGANIZED HEALTH CARE EDUCATION/TRAINING PROGRAM

## 2024-11-19 PROCEDURE — 87591 N.GONORRHOEAE DNA AMP PROB: CPT

## 2024-11-19 PROCEDURE — 87661 TRICHOMONAS VAGINALIS AMPLIF: CPT

## 2024-11-19 PROCEDURE — 88141 CYTOPATH C/V INTERPRET: CPT | Performed by: PATHOLOGY

## 2024-11-19 PROCEDURE — 87624 HPV HI-RISK TYP POOLED RSLT: CPT

## 2024-11-19 ASSESSMENT — ENCOUNTER SYMPTOMS
PSYCHIATRIC NEGATIVE: 0
GASTROINTESTINAL NEGATIVE: 0
ALLERGIC/IMMUNOLOGIC NEGATIVE: 0
MUSCULOSKELETAL NEGATIVE: 0
CARDIOVASCULAR NEGATIVE: 0
HEMATOLOGIC/LYMPHATIC NEGATIVE: 0
ENDOCRINE NEGATIVE: 0
NEUROLOGICAL NEGATIVE: 0
CONSTITUTIONAL NEGATIVE: 0
RESPIRATORY NEGATIVE: 0
EYES NEGATIVE: 0

## 2024-11-19 NOTE — PROGRESS NOTES
Patient ID: Scout Jim is a 29 y.o. female.    IUD Removal    Performed by: Garcia Velazquez MD  Authorized by: Garcia Velazquez MD    Procedure: IUD removal and insertion    Consent obtained by patient, parent, or legal power of  - including discussion of procedure risks and benefits, patient questions answered, and patient education provided: yes    Reason for removal: malposition    Strings visualized: yes    Cervix cleaned with: iodopovidone    Tenaculum applied to cervix: no    IUD grasped by forceps: yes    Performed with ultrasound guidance: no    IUD removed: yes    Removed without complications: yes    IUD intact: yes    Pregnancy risk: reasonably certain the patient is not pregnant    Cervix cleaned and prepped: yes    Tenaculum/Allis/Ring Forceps applied to cervix: no    Uterus sound depth (cm):  7  Cervix manually dilated: no    IUD inserted without complications: yes    OSM: levonorgestrel 20 mcg/24hr  Strings trimmed to (cm):  2  Patient tolerated procedure well: yes    Inserted with ultrasound guidance: no    Transvaginal sono confirmed fundal placement: no    Estimated blood loss (mL):  0  Intended removal date: 8 years    Insertion comments: Uncomplicated IUD exchange. IUD removed due to discomfort and sensation of foreign object. IUD body was palpated in cervix

## 2024-11-20 LAB
C TRACH RRNA SPEC QL NAA+PROBE: NEGATIVE
N GONORRHOEA DNA SPEC QL PROBE+SIG AMP: NEGATIVE
T VAGINALIS RRNA SPEC QL NAA+PROBE: NEGATIVE

## 2024-11-29 LAB
CYTOLOGY CMNT CVX/VAG CYTO-IMP: NORMAL
HPV HR 12 DNA GENITAL QL NAA+PROBE: NEGATIVE
HPV HR GENOTYPES PNL CVX NAA+PROBE: NEGATIVE
HPV16 DNA SPEC QL NAA+PROBE: NEGATIVE
HPV18 DNA SPEC QL NAA+PROBE: NEGATIVE
LAB AP HPV GENOTYPE QUESTION: YES
LAB AP HPV HR: NORMAL
LAB AP PAP ADDITIONAL TESTS: NORMAL
LABORATORY COMMENT REPORT: NORMAL
LABORATORY COMMENT REPORT: NORMAL
PATH REPORT.TOTAL CANCER: NORMAL

## 2024-12-03 ENCOUNTER — APPOINTMENT (OUTPATIENT)
Dept: OBSTETRICS AND GYNECOLOGY | Facility: CLINIC | Age: 29
End: 2024-12-03
Payer: COMMERCIAL

## 2024-12-16 ENCOUNTER — APPOINTMENT (OUTPATIENT)
Dept: OBSTETRICS AND GYNECOLOGY | Facility: CLINIC | Age: 29
End: 2024-12-16
Payer: COMMERCIAL

## 2024-12-19 ENCOUNTER — APPOINTMENT (OUTPATIENT)
Dept: OBSTETRICS AND GYNECOLOGY | Facility: CLINIC | Age: 29
End: 2024-12-19
Payer: COMMERCIAL

## 2024-12-23 ENCOUNTER — APPOINTMENT (OUTPATIENT)
Dept: OBSTETRICS AND GYNECOLOGY | Facility: CLINIC | Age: 29
End: 2024-12-23
Payer: COMMERCIAL

## 2024-12-23 ENCOUNTER — OFFICE VISIT (OUTPATIENT)
Dept: OBSTETRICS AND GYNECOLOGY | Facility: CLINIC | Age: 29
End: 2024-12-23
Payer: COMMERCIAL

## 2024-12-23 VITALS — BODY MASS INDEX: 31.55 KG/M2 | DIASTOLIC BLOOD PRESSURE: 70 MMHG | WEIGHT: 167 LBS | SYSTOLIC BLOOD PRESSURE: 118 MMHG

## 2024-12-23 DIAGNOSIS — N89.8 VAGINAL DISCHARGE: Primary | ICD-10-CM

## 2024-12-23 DIAGNOSIS — Z11.3 SCREEN FOR STD (SEXUALLY TRANSMITTED DISEASE): ICD-10-CM

## 2024-12-23 PROCEDURE — 87661 TRICHOMONAS VAGINALIS AMPLIF: CPT

## 2024-12-23 PROCEDURE — 87591 N.GONORRHOEAE DNA AMP PROB: CPT

## 2024-12-23 PROCEDURE — 87491 CHLMYD TRACH DNA AMP PROBE: CPT

## 2024-12-23 PROCEDURE — 99214 OFFICE O/P EST MOD 30 MIN: CPT | Performed by: OBSTETRICS & GYNECOLOGY

## 2024-12-23 PROCEDURE — 1036F TOBACCO NON-USER: CPT | Performed by: OBSTETRICS & GYNECOLOGY

## 2024-12-23 PROCEDURE — 87205 SMEAR GRAM STAIN: CPT

## 2024-12-23 PROCEDURE — 99459 PELVIC EXAMINATION: CPT | Performed by: OBSTETRICS & GYNECOLOGY

## 2024-12-23 NOTE — PROGRESS NOTES
"SUBJECTIVE    29 y.o.  ptpresents for STD screening.  Saw Dr. Velazquez for a new pregnancy in 2024.  Pt had elective termination through  in September.  Pt is reporting vaginal discharge-- clear and milky. This has also been associated with vaginal odor-- pt reports it is \"pungent\"  She has not taken OTC meds.    Pt did see Dr. Velazquez in 2024 for an IUD exchange. She had her initial IUD placed with her Eab at .  PAP at that time was normal/neg.      Sexually active with one partner.    OB/GYN History  No LMP recorded.    Social History     Substance and Sexual Activity   Sexual Activity Yes    Partners: Male    Birth control/protection: None       OB History    Para Term  AB Living   2 1 1   1 1   SAB IAB Ectopic Multiple Live Births     1     1      # Outcome Date GA Lbr Delio/2nd Weight Sex Type Anes PTL Lv   2 IAB      1st Tri Surg      1 Term 19    F Vag-Spont EPI N LUCIO       Past Medical History  She has a past medical history of Abnormal uterine and vaginal bleeding, unspecified (2017), Acute candidiasis of vulva and vagina (2018), Acute candidiasis of vulva and vagina (2015), Acute pharyngitis, unspecified (2016), Acute vaginitis (2017), Acute vaginitis (2016), Acute vaginitis (2015), Acute vaginitis (05/10/2017), Candidiasis of skin and nail (2018), Candidiasis, unspecified (2020), Dietary counseling and surveillance (2018), Dysuria (2016), Encounter for  screening for Streptococcus B (2019), Encounter for other general counseling and advice on procreation (2018), Encounter for screening for malignant neoplasm of cervix, High risk heterosexual behavior (2014), Low back pain, unspecified (2018), Lumbago with sciatica, right side (10/03/2019), Maternal care for abnormalities of the fetal heart rate or rhythm, unspecified trimester, not applicable or unspecified " (05/08/2019), Maternal care for abnormalities of the fetal heart rate or rhythm, unspecified trimester, not applicable or unspecified (07/23/2019), Maternal care for other (suspected) fetal abnormality and damage, fetal cardiac anomalies, not applicable or unspecified (05/29/2019), Otalgia, right ear (04/12/2017), Other general symptoms and signs (03/04/2016), Other mental disorders complicating pregnancy, first trimester ([facility]) (04/04/2019), Other seasonal allergic rhinitis (04/12/2017), Other specified abnormal uterine and vaginal bleeding (01/03/2014), Other specified conditions associated with female genital organs and menstrual cycle (03/04/2020), Other specified health status (07/07/2017), Other specified noninflammatory disorders of vagina (04/29/2016), Overweight (08/11/2017), Pain in right leg (01/24/2018), Personal history of nicotine dependence (01/24/2018), Personal history of nicotine dependence (01/24/2018), Personal history of nicotine dependence (04/25/2017), Personal history of other diseases of the digestive system (04/01/2019), Personal history of other diseases of the digestive system (06/26/2018), Personal history of other diseases of the digestive system (06/21/2018), Personal history of other diseases of the digestive system (06/23/2020), Personal history of other diseases of the female genital tract (07/16/2019), Personal history of other diseases of the female genital tract (12/05/2018), Personal history of other diseases of the female genital tract (06/12/2014), Personal history of other diseases of the female genital tract (04/29/2016), Personal history of other diseases of the female genital tract (01/17/2017), Personal history of other diseases of the female genital tract (01/24/2018), Personal history of other diseases of the female genital tract, Personal history of other diseases of the musculoskeletal system and connective tissue (03/04/2020), Personal history of other diseases of  the nervous system and sense organs (06/23/2020), Personal history of other diseases of the respiratory system (04/12/2017), Personal history of other drug therapy, Personal history of other infectious and parasitic diseases (05/15/2014), Personal history of other infectious and parasitic diseases (06/12/2014), Personal history of other specified conditions (06/08/2017), Personal history of other specified conditions (06/19/2017), Personal history of other specified conditions (09/25/2019), Personal history of other specified conditions (03/04/2016), Personal history of other specified conditions (03/04/2016), Personal history of other specified conditions (03/25/2016), Personal history of other specified conditions (01/17/2017), Pregnancy with inconclusive fetal viability, not applicable or unspecified (12/08/2018), Rash and other nonspecific skin eruption (07/06/2017), Rash and other nonspecific skin eruption (09/16/2020), Supervision of high risk pregnancy, unspecified, third trimester ([facility]) (08/18/2019), Supervision of high risk pregnancy, unspecified, third trimester ([facility]) (07/30/2019), Unspecified abdominal pain (04/25/2017), and Unspecified abnormalities of heart beat (03/04/2016).    Surgical History  She has a past surgical history that includes Other surgical history (05/15/2014) and CT angio neck (2/18/2020).     Social History  She reports that she has never smoked. She has never used smokeless tobacco. She reports that she does not currently use alcohol. She reports that she does not use drugs.    Medications:    Current Outpatient Medications:     levonorgestreL (Liletta) 20.1 mcg/24 hr intrauterine device, 52 mg by intrauterine route 1 time., Disp: , Rfl:     Screenings  Social Drivers of Health     Intimate Partner Violence: Not on file   Social Connections: Not on File (12/29/2022)    Received from [employer]    Social Connections     Connectedness: 0   Recent Concern: Social Connections - At  Risk (12/29/2022)    Received from [facility]    Social Connections     Social Connections and Isolation: 2   Alcohol Use: Not on file   Tobacco Use: Low Risk  (12/23/2024)    Patient History     Smoking Tobacco Use: Never     Smokeless Tobacco Use: Never     Passive Exposure: Not on file   Financial Resource Strain: Not on File (12/29/2022)    Received from "[employer]    Financial Resource Strain     Financial Resource Strain: 0   Depression: Not at risk (6/6/2024)    PHQ-2     PHQ-2 Score: 0   Postpartum Depression: Not on file   Stress: Not on File (12/29/2022)    Received from "[employer]    Stress     Stress: 0   Recent Concern: Stress - At Risk (12/29/2022)    Received from "[employer]    Stress     Stress: 2   Physical Activity: Not on File (12/29/2022)    Received from "[employer]    Physical Activity     Physical Activity: 0   Recent Concern: Physical Activity - At Risk (12/29/2022)    Received from "[employer]    Physical Activity     Physical Activity: 2   Food Insecurity: Not on File (12/29/2022)    Received from "[employer]    Food Insecurity     Food: 0   Recent Concern: Food Insecurity - At Risk (12/29/2022)    Received from "[employer]    Food Insecurity     Food: 2   Transportation Needs: Not on File (12/29/2022)    Received from "[employer]    Transportation Needs     Transportation: 0   Recent Concern: Transportation Needs - At Risk (12/29/2022)    Received from "[employer]    Transportation Needs     Transportation: 2         OBJECTIVE  Vitals:    12/23/24 1100   BP: 118/70   Weight: 75.8 kg (167 lb)     Body mass index is 31.55 kg/m².     Chaperone: Present: yes  Physical Exam  Constitutional:       Appearance: Normal appearance.   Genitourinary:      No lesions in the vagina.      Right Labia: No rash, tenderness or lesions.     Left Labia: No tenderness, lesions or rash.     No vaginal erythema, bleeding or ulceration.      No cervical discharge, friability or lesion.      IUD strings visualized.   Abdominal:      General: Abdomen is  flat. There is no distension.      Tenderness: There is no abdominal tenderness.   Neurological:      Mental Status: She is alert.        ASSESSMENT & PLAN  Problem List Items Addressed This Visit    None  Visit Diagnoses       Vaginal discharge    -  Primary    Relevant Orders    C. trachomatis / N. gonorrhoeae, Amplified    Trichomonas vaginalis, Amplified    Vaginitis Gram Stain For Bacterial Vaginosis + Yeast    Screen for STD (sexually transmitted disease)        Relevant Orders    C. trachomatis / N. gonorrhoeae, Amplified    Trichomonas vaginalis, Amplified            Follow up: Limited discharge on exam today. Testing sent. Will follow up results.    Homer Orourke MD  Obstetrics & Gynecology  12/23/24

## 2024-12-24 DIAGNOSIS — N89.8 VAGINAL DISCHARGE: Primary | ICD-10-CM

## 2024-12-24 LAB
C TRACH RRNA SPEC QL NAA+PROBE: NEGATIVE
CLUE CELLS VAG LPF-#/AREA: PRESENT /[LPF]
N GONORRHOEA DNA SPEC QL PROBE+SIG AMP: NEGATIVE
NUGENT SCORE: 10
T VAGINALIS RRNA SPEC QL NAA+PROBE: NEGATIVE
YEAST VAG WET PREP-#/AREA: ABNORMAL

## 2024-12-24 RX ORDER — METRONIDAZOLE 7.5 MG/G
GEL VAGINAL DAILY
Qty: 70 G | Refills: 0 | Status: SHIPPED | OUTPATIENT
Start: 2024-12-24 | End: 2024-12-29

## 2025-01-14 ENCOUNTER — APPOINTMENT (OUTPATIENT)
Dept: OBSTETRICS AND GYNECOLOGY | Facility: CLINIC | Age: 30
End: 2025-01-14
Payer: COMMERCIAL

## 2025-01-14 VITALS
HEIGHT: 61 IN | WEIGHT: 174 LBS | SYSTOLIC BLOOD PRESSURE: 124 MMHG | DIASTOLIC BLOOD PRESSURE: 70 MMHG | BODY MASS INDEX: 32.85 KG/M2

## 2025-01-14 DIAGNOSIS — N89.8 VAGINAL ODOR: Primary | ICD-10-CM

## 2025-01-14 DIAGNOSIS — N89.8 VAGINAL DISCHARGE: ICD-10-CM

## 2025-01-14 PROCEDURE — 1036F TOBACCO NON-USER: CPT | Performed by: OBSTETRICS & GYNECOLOGY

## 2025-01-14 PROCEDURE — 3008F BODY MASS INDEX DOCD: CPT | Performed by: OBSTETRICS & GYNECOLOGY

## 2025-01-14 PROCEDURE — 99459 PELVIC EXAMINATION: CPT | Performed by: OBSTETRICS & GYNECOLOGY

## 2025-01-14 PROCEDURE — 87205 SMEAR GRAM STAIN: CPT

## 2025-01-14 PROCEDURE — 99214 OFFICE O/P EST MOD 30 MIN: CPT | Performed by: OBSTETRICS & GYNECOLOGY

## 2025-01-14 RX ORDER — FLUCONAZOLE 150 MG/1
150 TABLET ORAL ONCE
Qty: 2 TABLET | Refills: 0 | Status: SHIPPED | OUTPATIENT
Start: 2025-01-14 | End: 2025-01-14

## 2025-01-14 ASSESSMENT — PATIENT HEALTH QUESTIONNAIRE - PHQ9
SUM OF ALL RESPONSES TO PHQ9 QUESTIONS 1 & 2: 0
2. FEELING DOWN, DEPRESSED OR HOPELESS: NOT AT ALL
1. LITTLE INTEREST OR PLEASURE IN DOING THINGS: NOT AT ALL

## 2025-01-14 ASSESSMENT — PAIN SCALES - GENERAL: PAINLEVEL_OUTOF10: 0-NO PAIN

## 2025-01-14 NOTE — PROGRESS NOTES
SUBJECTIVE    29 y.o.  Recent pregnancy female presents for   Chief Complaint   Patient presents with    Med Refill        Pt was seen on 2024-- at that time she was diagnosed with BV and was treated with Flagyl.  Today she is concerned she might have an HSV outbreak.  She reports her BV symptoms did improve, although she still has an odor.  She feels like she may have some open areas on her vulvar skin.  She was previously on Valtrex daily for prophylaxis but that prescription ran out earlier this month.    OB/GYN History  No LMP recorded (lmp unknown).    Social History     Substance and Sexual Activity   Sexual Activity Yes    Partners: Male    Birth control/protection: None       OB History    Para Term  AB Living   2 1 1   1 1   SAB IAB Ectopic Multiple Live Births     1     1      # Outcome Date GA Lbr Delio/2nd Weight Sex Type Anes PTL Lv   2 IAB      1st Tri Surg      1 Term 19    F Vag-Spont EPI N LUCIO       Past Medical History  She has a past medical history of Abnormal uterine and vaginal bleeding, unspecified (2017), Acute candidiasis of vulva and vagina (2018), Acute candidiasis of vulva and vagina (2015), Acute pharyngitis, unspecified (2016), Acute vaginitis (2017), Acute vaginitis (2016), Acute vaginitis (2015), Acute vaginitis (05/10/2017), Candidiasis of skin and nail (2018), Candidiasis, unspecified (2020), Dietary counseling and surveillance (2018), Dysuria (2016), Encounter for  screening for Streptococcus B (2019), Encounter for other general counseling and advice on procreation (2018), Encounter for screening for malignant neoplasm of cervix, High risk heterosexual behavior (2014), Low back pain, unspecified (2018), Lumbago with sciatica, right side (10/03/2019), Maternal care for abnormalities of the fetal heart rate or rhythm, unspecified trimester, not  applicable or unspecified (05/08/2019), Maternal care for abnormalities of the fetal heart rate or rhythm, unspecified trimester, not applicable or unspecified (07/23/2019), Maternal care for other (suspected) fetal abnormality and damage, fetal cardiac anomalies, not applicable or unspecified (05/29/2019), Otalgia, right ear (04/12/2017), Other general symptoms and signs (03/04/2016), Other mental disorders complicating pregnancy, first trimester ([facility]) (04/04/2019), Other seasonal allergic rhinitis (04/12/2017), Other specified abnormal uterine and vaginal bleeding (01/03/2014), Other specified conditions associated with female genital organs and menstrual cycle (03/04/2020), Other specified health status (07/07/2017), Other specified noninflammatory disorders of vagina (04/29/2016), Overweight (08/11/2017), Pain in right leg (01/24/2018), Personal history of nicotine dependence (01/24/2018), Personal history of nicotine dependence (01/24/2018), Personal history of nicotine dependence (04/25/2017), Personal history of other diseases of the digestive system (04/01/2019), Personal history of other diseases of the digestive system (06/26/2018), Personal history of other diseases of the digestive system (06/21/2018), Personal history of other diseases of the digestive system (06/23/2020), Personal history of other diseases of the female genital tract (07/16/2019), Personal history of other diseases of the female genital tract (12/05/2018), Personal history of other diseases of the female genital tract (06/12/2014), Personal history of other diseases of the female genital tract (04/29/2016), Personal history of other diseases of the female genital tract (01/17/2017), Personal history of other diseases of the female genital tract (01/24/2018), Personal history of other diseases of the female genital tract, Personal history of other diseases of the musculoskeletal system and connective tissue (03/04/2020), Personal  history of other diseases of the nervous system and sense organs (06/23/2020), Personal history of other diseases of the respiratory system (04/12/2017), Personal history of other drug therapy, Personal history of other infectious and parasitic diseases (05/15/2014), Personal history of other infectious and parasitic diseases (06/12/2014), Personal history of other specified conditions (06/08/2017), Personal history of other specified conditions (06/19/2017), Personal history of other specified conditions (09/25/2019), Personal history of other specified conditions (03/04/2016), Personal history of other specified conditions (03/04/2016), Personal history of other specified conditions (03/25/2016), Personal history of other specified conditions (01/17/2017), Pregnancy with inconclusive fetal viability, not applicable or unspecified (12/08/2018), Rash and other nonspecific skin eruption (07/06/2017), Rash and other nonspecific skin eruption (09/16/2020), Supervision of high risk pregnancy, unspecified, third trimester ([facility]) (08/18/2019), Supervision of high risk pregnancy, unspecified, third trimester ([facility]) (07/30/2019), Unspecified abdominal pain (04/25/2017), and Unspecified abnormalities of heart beat (03/04/2016).    Surgical History  She has a past surgical history that includes Other surgical history (05/15/2014) and CT angio neck (2/18/2020).     Social History  She reports that she has never smoked. She has never used smokeless tobacco. She reports that she does not currently use alcohol. She reports that she does not use drugs.    Medications:    Current Outpatient Medications:     levonorgestreL (Liletta) 20.1 mcg/24 hr intrauterine device, 52 mg by intrauterine route 1 time., Disp: , Rfl:     Screenings  Social Drivers of Health     Intimate Partner Violence: Not on file   Social Connections: Not on File (12/29/2022)    Received from [employer]    Social Connections     Connectedness: 0   Recent  "Concern: Social Connections - At Risk (12/29/2022)    Received from [facility] [employer]    Social Connections     Social Connections and Isolation: 2   Alcohol Use: Not on file   Tobacco Use: Low Risk  (1/14/2025)    Patient History     Smoking Tobacco Use: Never     Smokeless Tobacco Use: Never     Passive Exposure: Not on file   Financial Resource Strain: Not on File (12/29/2022)    Received from the [facility]    Financial Resource Strain     Financial Resource Strain: 0   Depression: Not at risk (1/14/2025)    PHQ-2     PHQ-2 Score: 0   Postpartum Depression: Not on file   Stress: Not on File (12/29/2022)    Received from the [facility]    Stress     Stress: 0   Recent Concern: Stress - At Risk (12/29/2022)    Received from the [facility]    Stress     Stress: 2   Physical Activity: Not on File (12/29/2022)    Received from the [facility]    Physical Activity     Physical Activity: 0   Recent Concern: Physical Activity - At Risk (12/29/2022)    Received from the [facility]    Physical Activity     Physical Activity: 2   Food Insecurity: Not on File (12/29/2022)    Received from [employer]    Food Insecurity     Food: 0   Recent Concern: Food Insecurity - At Risk (12/29/2022)    Received from the [facility]    Food Insecurity     Food: 2   Transportation Needs: Not on File (12/29/2022)    Received from the [facility]    Transportation Needs     Transportation: 0   Recent Concern: Transportation Needs - At Risk (12/29/2022)    Received from the [facility]    Transportation Needs     Transportation: 2         OBJECTIVE  Vitals:    01/14/25 1014   BP: 124/70   Weight: 78.9 kg (174 lb)   Height: 1.549 m (5' 1\")     Body mass index is 32.88 kg/m².     Chaperone: Present: yes  Physical Exam  Constitutional:       Appearance: Normal appearance.   Genitourinary:      No lesions in the vagina.      Right Labia: No rash, tenderness or lesions.     Left Labia: No tenderness, lesions or rash.     Vaginal discharge (thick clumpy and white) present.      No vaginal erythema, bleeding or ulceration.     "  No cervical discharge, friability or lesion.   Abdominal:      General: Abdomen is flat. There is no distension.      Tenderness: There is no abdominal tenderness.   Neurological:      Mental Status: She is alert.        ASSESSMENT & PLAN  Problem List Items Addressed This Visit    None  Visit Diagnoses       Vaginal odor    -  Primary    Relevant Orders    Vaginitis Gram Stain For Bacterial Vaginosis + Yeast    Vaginal discharge        Relevant Medications    fluconazole (Diflucan) 150 mg tablet            Follow up: Suspect candida--will treat with Diflucan. No evidence of HSV on exam-- it has been over 2 years since the patient's last outbreak so we will hold on resuming prophylaxis for now.    Homer Orourke MD  Obstetrics & Gynecology  01/14/25

## 2025-01-15 LAB
BACTERIAL VAGINOSIS VAG-IMP: NORMAL
CLUE CELLS VAG LPF-#/AREA: NORMAL /[LPF]
NUGENT SCORE: 4
YEAST VAG WET PREP-#/AREA: NORMAL

## 2025-01-18 ENCOUNTER — HOSPITAL ENCOUNTER (EMERGENCY)
Facility: HOSPITAL | Age: 30
Discharge: HOME | End: 2025-01-19
Payer: COMMERCIAL

## 2025-01-18 DIAGNOSIS — R10.13 EPIGASTRIC ABDOMINAL PAIN: Primary | ICD-10-CM

## 2025-01-18 DIAGNOSIS — M54.9 ACUTE MID BACK PAIN: ICD-10-CM

## 2025-01-18 PROCEDURE — 99285 EMERGENCY DEPT VISIT HI MDM: CPT

## 2025-01-18 ASSESSMENT — PAIN SCALES - GENERAL: PAINLEVEL_OUTOF10: 10 - WORST POSSIBLE PAIN

## 2025-01-18 ASSESSMENT — PAIN - FUNCTIONAL ASSESSMENT: PAIN_FUNCTIONAL_ASSESSMENT: 0-10

## 2025-01-18 ASSESSMENT — PAIN DESCRIPTION - LOCATION: LOCATION: BACK

## 2025-01-19 ENCOUNTER — APPOINTMENT (OUTPATIENT)
Dept: RADIOLOGY | Facility: HOSPITAL | Age: 30
End: 2025-01-19
Payer: COMMERCIAL

## 2025-01-19 VITALS
BODY MASS INDEX: 31.18 KG/M2 | OXYGEN SATURATION: 97 % | SYSTOLIC BLOOD PRESSURE: 123 MMHG | WEIGHT: 165 LBS | RESPIRATION RATE: 24 BRPM | TEMPERATURE: 98.3 F | DIASTOLIC BLOOD PRESSURE: 54 MMHG | HEART RATE: 103 BPM

## 2025-01-19 PROBLEM — Z86.19 HISTORY OF INFECTION DUE TO HUMAN PAPILLOMA VIRUS (HPV): Status: ACTIVE | Noted: 2025-01-19

## 2025-01-19 PROBLEM — E66.811 OBESITY, CLASS I, BMI 30-34.9: Status: ACTIVE | Noted: 2023-04-11

## 2025-01-19 PROBLEM — F33.9 MAJOR DEPRESSIVE DISORDER, RECURRENT, UNSPECIFIED: Chronic | Status: ACTIVE | Noted: 2021-07-16

## 2025-01-19 PROBLEM — R42 LIGHTHEADEDNESS: Status: ACTIVE | Noted: 2017-12-19

## 2025-01-19 PROBLEM — Z59.41 FOOD INSECURITY: Status: ACTIVE | Noted: 2025-01-19

## 2025-01-19 PROBLEM — K64.9 HEMORRHOIDS: Status: ACTIVE | Noted: 2025-01-19

## 2025-01-19 PROBLEM — Z97.5 PRESENCE OF INTRAUTERINE CONTRACEPTIVE DEVICE: Status: ACTIVE | Noted: 2025-01-19

## 2025-01-19 LAB
ALBUMIN SERPL BCP-MCNC: 4.2 G/DL (ref 3.4–5)
ALP SERPL-CCNC: 69 U/L (ref 33–110)
ALT SERPL W P-5'-P-CCNC: 38 U/L (ref 7–45)
AMPHETAMINES UR QL SCN: NORMAL
ANION GAP SERPL CALC-SCNC: 13 MMOL/L (ref 10–20)
APPEARANCE UR: CLEAR
AST SERPL W P-5'-P-CCNC: 53 U/L (ref 9–39)
BARBITURATES UR QL SCN: NORMAL
BASOPHILS # BLD AUTO: 0.03 X10*3/UL (ref 0–0.1)
BASOPHILS NFR BLD AUTO: 0.3 %
BENZODIAZ UR QL SCN: NORMAL
BILIRUB SERPL-MCNC: 0.4 MG/DL (ref 0–1.2)
BILIRUB UR STRIP.AUTO-MCNC: NEGATIVE MG/DL
BUN SERPL-MCNC: 10 MG/DL (ref 6–23)
BZE UR QL SCN: NORMAL
CALCIUM SERPL-MCNC: 8.6 MG/DL (ref 8.6–10.3)
CANNABINOIDS UR QL SCN: NORMAL
CARDIAC TROPONIN I PNL SERPL HS: <3 NG/L (ref 0–13)
CHLORIDE SERPL-SCNC: 102 MMOL/L (ref 98–107)
CO2 SERPL-SCNC: 24 MMOL/L (ref 21–32)
COLOR UR: NORMAL
CREAT SERPL-MCNC: 0.79 MG/DL (ref 0.5–1.05)
EGFRCR SERPLBLD CKD-EPI 2021: >90 ML/MIN/1.73M*2
EOSINOPHIL # BLD AUTO: 0.02 X10*3/UL (ref 0–0.7)
EOSINOPHIL NFR BLD AUTO: 0.2 %
ERYTHROCYTE [DISTWIDTH] IN BLOOD BY AUTOMATED COUNT: 13.2 % (ref 11.5–14.5)
FENTANYL+NORFENTANYL UR QL SCN: NORMAL
GLUCOSE SERPL-MCNC: 116 MG/DL (ref 74–99)
GLUCOSE UR STRIP.AUTO-MCNC: NORMAL MG/DL
HCG UR QL IA.RAPID: NEGATIVE
HCT VFR BLD AUTO: 38.4 % (ref 36–46)
HGB BLD-MCNC: 12.6 G/DL (ref 12–16)
IMM GRANULOCYTES # BLD AUTO: 0.03 X10*3/UL (ref 0–0.7)
IMM GRANULOCYTES NFR BLD AUTO: 0.3 % (ref 0–0.9)
KETONES UR STRIP.AUTO-MCNC: NEGATIVE MG/DL
LEUKOCYTE ESTERASE UR QL STRIP.AUTO: NEGATIVE
LIPASE SERPL-CCNC: 29 U/L (ref 9–82)
LYMPHOCYTES # BLD AUTO: 1.41 X10*3/UL (ref 1.2–4.8)
LYMPHOCYTES NFR BLD AUTO: 15.1 %
MCH RBC QN AUTO: 28.4 PG (ref 26–34)
MCHC RBC AUTO-ENTMCNC: 32.8 G/DL (ref 32–36)
MCV RBC AUTO: 87 FL (ref 80–100)
METHADONE UR QL SCN: NORMAL
MONOCYTES # BLD AUTO: 0.43 X10*3/UL (ref 0.1–1)
MONOCYTES NFR BLD AUTO: 4.6 %
NEUTROPHILS # BLD AUTO: 7.4 X10*3/UL (ref 1.2–7.7)
NEUTROPHILS NFR BLD AUTO: 79.5 %
NITRITE UR QL STRIP.AUTO: NEGATIVE
NRBC BLD-RTO: 0 /100 WBCS (ref 0–0)
OPIATES UR QL SCN: NORMAL
OXYCODONE+OXYMORPHONE UR QL SCN: NORMAL
PCP UR QL SCN: NORMAL
PH UR STRIP.AUTO: 7 [PH]
PLATELET # BLD AUTO: 362 X10*3/UL (ref 150–450)
POTASSIUM SERPL-SCNC: 3.5 MMOL/L (ref 3.5–5.3)
PROT SERPL-MCNC: 7.4 G/DL (ref 6.4–8.2)
PROT UR STRIP.AUTO-MCNC: NEGATIVE MG/DL
RBC # BLD AUTO: 4.43 X10*6/UL (ref 4–5.2)
RBC # UR STRIP.AUTO: NEGATIVE /UL
SODIUM SERPL-SCNC: 135 MMOL/L (ref 136–145)
SP GR UR STRIP.AUTO: 1.02
UROBILINOGEN UR STRIP.AUTO-MCNC: NORMAL MG/DL
WBC # BLD AUTO: 9.3 X10*3/UL (ref 4.4–11.3)

## 2025-01-19 PROCEDURE — 2500000004 HC RX 250 GENERAL PHARMACY W/ HCPCS (ALT 636 FOR OP/ED): Performed by: PHYSICIAN ASSISTANT

## 2025-01-19 PROCEDURE — 96374 THER/PROPH/DIAG INJ IV PUSH: CPT

## 2025-01-19 PROCEDURE — 80053 COMPREHEN METABOLIC PANEL: CPT | Performed by: PHYSICIAN ASSISTANT

## 2025-01-19 PROCEDURE — 96375 TX/PRO/DX INJ NEW DRUG ADDON: CPT

## 2025-01-19 PROCEDURE — 36415 COLL VENOUS BLD VENIPUNCTURE: CPT | Performed by: PHYSICIAN ASSISTANT

## 2025-01-19 PROCEDURE — 80307 DRUG TEST PRSMV CHEM ANLYZR: CPT | Performed by: PHYSICIAN ASSISTANT

## 2025-01-19 PROCEDURE — 76705 ECHO EXAM OF ABDOMEN: CPT | Performed by: RADIOLOGY

## 2025-01-19 PROCEDURE — 71045 X-RAY EXAM CHEST 1 VIEW: CPT

## 2025-01-19 PROCEDURE — 71045 X-RAY EXAM CHEST 1 VIEW: CPT | Performed by: STUDENT IN AN ORGANIZED HEALTH CARE EDUCATION/TRAINING PROGRAM

## 2025-01-19 PROCEDURE — 83690 ASSAY OF LIPASE: CPT | Performed by: PHYSICIAN ASSISTANT

## 2025-01-19 PROCEDURE — 76705 ECHO EXAM OF ABDOMEN: CPT

## 2025-01-19 PROCEDURE — 84484 ASSAY OF TROPONIN QUANT: CPT | Performed by: PHYSICIAN ASSISTANT

## 2025-01-19 PROCEDURE — 81003 URINALYSIS AUTO W/O SCOPE: CPT | Mod: 59 | Performed by: PHYSICIAN ASSISTANT

## 2025-01-19 PROCEDURE — 85025 COMPLETE CBC W/AUTO DIFF WBC: CPT | Performed by: PHYSICIAN ASSISTANT

## 2025-01-19 PROCEDURE — 81025 URINE PREGNANCY TEST: CPT | Performed by: PHYSICIAN ASSISTANT

## 2025-01-19 RX ORDER — FAMOTIDINE 20 MG/1
20 TABLET, FILM COATED ORAL 2 TIMES DAILY PRN
Qty: 20 TABLET | Refills: 0 | Status: SHIPPED | OUTPATIENT
Start: 2025-01-19

## 2025-01-19 RX ORDER — IBUPROFEN 600 MG/1
600 TABLET ORAL EVERY 6 HOURS PRN
Qty: 20 TABLET | Refills: 0 | Status: SHIPPED | OUTPATIENT
Start: 2025-01-19

## 2025-01-19 RX ORDER — FAMOTIDINE 10 MG/ML
20 INJECTION INTRAVENOUS ONCE
Status: COMPLETED | OUTPATIENT
Start: 2025-01-19 | End: 2025-01-19

## 2025-01-19 RX ORDER — KETOROLAC TROMETHAMINE 30 MG/ML
30 INJECTION, SOLUTION INTRAMUSCULAR; INTRAVENOUS ONCE
Status: COMPLETED | OUTPATIENT
Start: 2025-01-19 | End: 2025-01-19

## 2025-01-19 RX ADMIN — FAMOTIDINE 20 MG: 10 INJECTION, SOLUTION INTRAVENOUS at 02:24

## 2025-01-19 RX ADMIN — KETOROLAC TROMETHAMINE 30 MG: 30 INJECTION, SOLUTION INTRAMUSCULAR at 02:23

## 2025-01-19 ASSESSMENT — PAIN SCALES - GENERAL
PAINLEVEL_OUTOF10: 0 - NO PAIN
PAINLEVEL_OUTOF10: 10 - WORST POSSIBLE PAIN

## 2025-01-19 NOTE — ED TRIAGE NOTES
Biba from home for c/o bilateral lower back pain described as sharp and shooting pain onset 1 hour prior to arrival. Pt endorses nausea. Pt states she started taking a detox beverage and onset of pain occurred after ingesting beverage.

## 2025-01-19 NOTE — DISCHARGE INSTRUCTIONS
Please begin famotidine.  Take this medication twice per day as needed.  Please follow-up with primary care doctor in the next 2 to 5 days  Please continue Tylenol and/or ibuprofen as needed for pain.  Return to ER for any new or worsening symptoms.

## 2025-01-19 NOTE — ED PROVIDER NOTES
Chief Complaint   Patient presents with    Back Pain     HPI:   Scout Jim is an 29 y.o. female with history of KONG/MDD, HSV, vitamin deficiency who presents to the ED for evaluation of back pain and abdominal pain.  Patient says that her pain began about an hour after she ate dinner.  She had grilled chicken, potatoes and salad.  Localizes it to her epigastrium and radiates around abdomen into her mid upper back.  She thought that it was her kidneys that were in pain so she drank [employer] smooth move detox tea.  She said about 20 minutes later her pain got significantly worse.  She tried drinking water but that did not help either.  She rates her pain 10/10.  Describes it as dull and constant.  She does note that she has had this multiple times in the past and has not had any similar reaction.  She denies personal or family history of bleeding clotting disorder, connective tissue disorder, CAD/MI.  Still has her gallbladder.  Denies recent travel or surgeries.  Said that she has Mirena IUD.  Denies tobacco use.  Last BM was earlier today and it was hard and firm but nonbloody.  She endorses nausea without emesis.  She said she had spotting a week ago but due to the Mirena she does not always have normal menses.  Denies chance of pregnancy.  Denies dysuria, hematuria, vaginal discharge, urinary frequency or urgency, fever, chills.    Medications: Denies any  Soc HX:  No Known Allergies: NKDA  Past Medical History:   Diagnosis Date    Abnormal uterine and vaginal bleeding, unspecified 2017    Vaginal bleeding, abnormal    Acute pharyngitis, unspecified 2016    Sore throat    Candidiasis of skin and nail 2018    Candidal intertrigo    Candidiasis, unspecified 2020    Yeast infection    Dietary counseling and surveillance 2018    Dietary counseling    Encounter for  screening for Streptococcus B 2019     screening for streptococcus B     Encounter for other general counseling and advice on procreation 06/21/2018    Encounter for preconception consultation    Encounter for screening for malignant neoplasm of cervix     Encounter for Papanicolaou smear for cervical cancer screening    High risk heterosexual behavior 06/12/2014    Risk for sexually transmitted disease    Low back pain, unspecified 01/24/2018    Lumbar pain    Lumbago with sciatica, right side 10/03/2019    Chronic midline low back pain with right-sided sciatica    Otalgia, right ear 04/12/2017    Right ear pain    Other general symptoms and signs 03/04/2016    Cold intolerance    Other seasonal allergic rhinitis 04/12/2017    Seasonal allergies    Other specified abnormal uterine and vaginal bleeding 01/03/2014    Dysfunctional uterine bleeding    Other specified conditions associated with female genital organs and menstrual cycle 03/04/2020    Uterine cramping    Other specified health status 07/07/2017    Contraception    Other specified noninflammatory disorders of vagina 04/29/2016    Vaginal odor    Overweight 08/11/2017    Overweight    Personal history of other diseases of the digestive system 06/21/2018    History of constipation    Personal history of other diseases of the female genital tract 07/16/2019    History of vaginal discharge    Personal history of other diseases of the female genital tract 06/12/2014    History of menorrhagia    Personal history of other diseases of the musculoskeletal system and connective tissue 03/04/2020    History of low back pain    Personal history of other diseases of the nervous system and sense organs 06/23/2020    History of farsightedness    Personal history of other diseases of the respiratory system 04/12/2017    History of pharyngitis    Personal history of other drug therapy     History of influenza vaccination    Personal history of other infectious and parasitic diseases 05/15/2014    History of trichomoniasis    Personal history  of other infectious and parasitic diseases 06/12/2014    History of chlamydia infection    Personal history of other specified conditions 03/25/2016    History of urinary frequency    Rash and other nonspecific skin eruption 07/06/2017    Rash, skin    Unspecified abnormalities of heart beat 03/04/2016    Abnormal heart rate     Past Surgical History:   Procedure Laterality Date    CT ANGIO NECK  2/18/2020    CT NECK ANGIO W AND WO IV CONTRAST 2/18/2020 [facility] [place] [facility]ERGENCY LEGAC[facility]    OTHER SURGICAL HISTORY  05/15/2014    Blood Transfusion (___ Ml)     Family History   Problem Relation Name Age of Onset    No Known Problems Mother      No Known Problems Father      Other (menorrhagia) Maternal Grandmother      Intellectual Disability Other Maternal half brother       Physical Exam  Vitals and nursing note reviewed.   Constitutional:       General: She is not in acute distress.     Appearance: She is not ill-appearing or toxic-appearing.      Comments: Elevated BMI.   HENT:      Right Ear: External ear normal.      Left Ear: External ear normal.      Mouth/Throat:      Mouth: Mucous membranes are moist.   Eyes:      Pupils: Pupils are equal, round, and reactive to light.   Cardiovascular:      Rate and Rhythm: Normal rate and regular rhythm.      Pulses: Normal pulses.      Heart sounds: Normal heart sounds.   Pulmonary:      Effort: Pulmonary effort is normal. No respiratory distress.      Breath sounds: Normal breath sounds.   Abdominal:      General: Bowel sounds are normal.      Palpations: Abdomen is soft.      Tenderness: There is abdominal tenderness (Epigastric). There is no right CVA tenderness, left CVA tenderness, guarding or rebound.      Comments: Negative Worthy   Musculoskeletal:         General: Normal range of motion.      Cervical back: Normal range of motion.   Lymphadenopathy:      Cervical: No cervical adenopathy.   Skin:     General: Skin is warm and dry.   Neurological:      Mental Status: She is  alert.      Cranial Nerves: No cranial nerve deficit.     VS: As documented in the triage note and EMR flowsheet from this visit were reviewed.    External Records Reviewed: I reviewed recent and relevant outside records including: Reviewed GYN note 1/14/2025.  Patient seen for medication refill.  Provider noted that patient had been treated for BV in late December and was concerned that maybe she might have an HSV outbreak.  ED symptoms improved but she still had odor. She was given fluconazole.    EKG INTERPRETATION:      Personally Reviewed      Rhythm:  Normal sinus rhythm      Rate:        Axis: Normal axis      Intervals:  Normal CO interval      QRS Complex:  Normal      ST Segment:  Normal ST-T segments      QT Interval:  Normal (QTc XXX ms)      Compared with Prior:  Unchanged       Medical Decision Making:   ED Course as of 01/19/25 0349   Sun Jan 19, 2025   0051 Vitals Reviewed: Afebrile. Normotensive. Not tachycardic nor tachypneic. No hypoxia.   [KA]   0129 Patient is 29-year-old female who presents to the ED for evaluation of epigastric abdominal pain and mid back pain.  On exam she has tenderness at the epigastrium.  Negative Worthy's.  No CVA tenderness.  Lungs are clear.  She is afebrile.  Symmetric pulses.  Normal cap refill.  DDx includes, but is not limited to, biliary colic, cholecystitis, pancreatitis, GERD, musculoskeletal sprain/strain, urolithiasis, ACS.  Obtain labs, ECG, chest x-ray and right upper quadrant ultrasound. [KA]   0254 I personally viewed labs.  Lipase normal.  CBC without abnormalities.  CMP shows mild hyponatremia otherwise normal electrolytes, normal renal function, slightly elevated AST otherwise normal LFTs.  Urine still in process.  Chest x-ray read by radiology as normal.  Gallbladder ultrasound still in process. [KA]   0337 IMPRESSION:  No evidence of cholelithiasis or acute cholecystitis   [KA]   0337 IMPRESSION:  1. No acute cardiopulmonary process.   [KA]   0345  Cussed results with patient.  She says she feels better following medication administration.  She is asking for food.  Will send her home with prescription for famotidine and ibuprofen.  She does not have PCP for follow-up.  Will place referral for her to establish care with PCP. [KA]      ED Course User Index  [KA] Marizol Stuart PA-C         Diagnoses as of 01/19/25 0349   Epigastric abdominal pain   Acute mid back pain      Escalation of Care: Appropriate for outpatient management     Counseling: Spoke with the patient and discussed today´s findings, in addition to providing specific details for the plan of care and expected course.  Patient was given the opportunity to ask questions.    Discussed return precautions and importance of follow-up.  Advised to follow-up with PCP.  Advised to return to the ED for changing or worsening symptoms, new symptoms, complaint specific precautions, and precautions listed on the discharge paperwork.  Educated on the common potential side effects of medications prescribed.    I advised the patient that the emergency evaluation and treatment provided today doesn't end their need for medical care. It is very important that they follow-up with their primary care provider or other specialist as instructed.    The plan of care was mutually agreed upon with the patient. The patient and/or family were given the opportunity to ask questions. All questions asked today in the ED were answered to the best of my ability with today's information.    I specifically advised the patient to return to the ED for changing or worsening symptoms, worrisome new symptoms, or for any complaint specific precautions listed on the discharge paperwork.    This patient was cared for in the setting of nationwide stress on resources and staffing.    This report was transcribed using voice recognition software.  Every effort was made to ensure accuracy, however, inadvertently computerized transcription  errors may be present.       Marizol Sutart PA-C  01/19/25 0349

## 2025-03-03 ENCOUNTER — APPOINTMENT (OUTPATIENT)
Dept: OBSTETRICS AND GYNECOLOGY | Facility: CLINIC | Age: 30
End: 2025-03-03
Payer: COMMERCIAL

## 2025-03-03 VITALS
HEIGHT: 61 IN | SYSTOLIC BLOOD PRESSURE: 120 MMHG | DIASTOLIC BLOOD PRESSURE: 70 MMHG | BODY MASS INDEX: 32.28 KG/M2 | WEIGHT: 171 LBS

## 2025-03-03 DIAGNOSIS — B00.9 RECURRENT HSV (HERPES SIMPLEX VIRUS): ICD-10-CM

## 2025-03-03 DIAGNOSIS — B00.9 HSV INFECTION: Primary | ICD-10-CM

## 2025-03-03 PROCEDURE — 3008F BODY MASS INDEX DOCD: CPT | Performed by: OBSTETRICS & GYNECOLOGY

## 2025-03-03 PROCEDURE — 99459 PELVIC EXAMINATION: CPT | Performed by: OBSTETRICS & GYNECOLOGY

## 2025-03-03 PROCEDURE — 99213 OFFICE O/P EST LOW 20 MIN: CPT | Performed by: OBSTETRICS & GYNECOLOGY

## 2025-03-03 PROCEDURE — 1036F TOBACCO NON-USER: CPT | Performed by: OBSTETRICS & GYNECOLOGY

## 2025-03-03 RX ORDER — VALACYCLOVIR HYDROCHLORIDE 1 G/1
1000 TABLET, FILM COATED ORAL 2 TIMES DAILY
Qty: 10 TABLET | Refills: 0 | Status: SHIPPED | OUTPATIENT
Start: 2025-03-03 | End: 2025-03-08

## 2025-03-03 RX ORDER — VALACYCLOVIR HYDROCHLORIDE 1 G/1
1000 TABLET, FILM COATED ORAL DAILY
Qty: 30 TABLET | Refills: 11 | Status: SHIPPED | OUTPATIENT
Start: 2025-03-03 | End: 2026-02-26

## 2025-03-03 RX ORDER — LEVONORGESTREL 52 MG/1
1 INTRAUTERINE DEVICE INTRAUTERINE ONCE
COMMUNITY

## 2025-03-03 ASSESSMENT — PATIENT HEALTH QUESTIONNAIRE - PHQ9
SUM OF ALL RESPONSES TO PHQ9 QUESTIONS 1 & 2: 0
1. LITTLE INTEREST OR PLEASURE IN DOING THINGS: NOT AT ALL
2. FEELING DOWN, DEPRESSED OR HOPELESS: NOT AT ALL

## 2025-03-03 NOTE — PROGRESS NOTES
"SUBJECTIVE    29 y.o.  Recent pregnancy female presents for   Chief Complaint   Patient presents with    std check     Std check      Pt was seen 2025 for a suspected HSV outbreak. Exam at that time did not show HSV and testing for BV/yeast was negative. Today she reports she has an outbreak every month.  She was previously on Valtrex for suppression but she ran out of this medication.  For the last 24 hours she feels like she has a new outbreak-- pt reports stinging on her left labia.  She has examined and sees \"a small bump.\"    Off of Valtrex, she would have outbreaks at least 2x/month.    OB/GYN History  No LMP recorded.    Social History     Substance and Sexual Activity   Sexual Activity Yes    Partners: Male    Birth control/protection: None       OB History    Para Term  AB Living   2 1 1   1 1   SAB IAB Ectopic Multiple Live Births     1     1      # Outcome Date GA Lbr Delio/2nd Weight Sex Type Anes PTL Lv   2 IAB      1st Tri Surg      1 Term 21    F Vag-Spont EPI N LUCIO       Past Medical History  She has a past medical history of Abnormal uterine and vaginal bleeding, unspecified (2017), Acute pharyngitis, unspecified (2016), Candidiasis of skin and nail (2018), Candidiasis, unspecified (2020), Dietary counseling and surveillance (2018), Encounter for  screening for Streptococcus B (2019), Encounter for other general counseling and advice on procreation (2018), Encounter for screening for malignant neoplasm of cervix, High risk heterosexual behavior (2014), Low back pain, unspecified (2018), Lumbago with sciatica, right side (10/03/2019), Otalgia, right ear (2017), Other general symptoms and signs (2016), Other seasonal allergic rhinitis (2017), Other specified abnormal uterine and vaginal bleeding (2014), Other specified conditions associated with female genital organs and menstrual " cycle (03/04/2020), Other specified health status (07/07/2017), Other specified noninflammatory disorders of vagina (04/29/2016), Overweight (08/11/2017), Personal history of other diseases of the digestive system (06/21/2018), Personal history of other diseases of the female genital tract (07/16/2019), Personal history of other diseases of the female genital tract (06/12/2014), Personal history of other diseases of the musculoskeletal system and connective tissue (03/04/2020), Personal history of other diseases of the nervous system and sense organs (06/23/2020), Personal history of other diseases of the respiratory system (04/12/2017), Personal history of other drug therapy, Personal history of other infectious and parasitic diseases (05/15/2014), Personal history of other infectious and parasitic diseases (06/12/2014), Personal history of other specified conditions (03/25/2016), Rash and other nonspecific skin eruption (07/06/2017), and Unspecified abnormalities of heart beat (03/04/2016).    Surgical History  She has a past surgical history that includes Other surgical history (05/15/2014) and CT angio neck (2/18/2020).     Social History  She reports that she has never smoked. She has never used smokeless tobacco. She reports that she does not currently use alcohol. She reports that she does not use drugs.    Medications:    Current Outpatient Medications:     famotidine (Pepcid) 20 mg tablet, Take 1 tablet (20 mg) by mouth 2 times a day as needed for heartburn. (Patient not taking: Reported on 3/3/2025), Disp: 20 tablet, Rfl: 0    ibuprofen 600 mg tablet, Take 1 tablet (600 mg) by mouth every 6 hours if needed (pain, fever). (Patient not taking: Reported on 3/3/2025), Disp: 20 tablet, Rfl: 0    levonorgestreL (Liletta) 20.1 mcg/24 hr intrauterine device, 52 mg by intrauterine route 1 time. (Patient not taking: Reported on 3/3/2025), Disp: , Rfl:     levonorgestrel (Mirena) 20 mcg/24hr IUD, 52 mg by  "intrauterine route 1 time., Disp: , Rfl:     Screenings  Social Drivers of Health     Intimate Partner Violence: Not on file   Social Connections: Not on File (12/29/2022)    Received from [facility]    Social Connections     Connectedness: 0   Recent Concern: Social Connections - At Risk (12/29/2022)    Received from [facility]    Social Connections     Social Connections and Isolation: 2   Alcohol Use: Not on file   Tobacco Use: Low Risk  (3/3/2025)    Patient History     Smoking Tobacco Use: Never     Smokeless Tobacco Use: Never     Passive Exposure: Not on file   Financial Resource Strain: Not on File (12/29/2022)    Received from [facility]    Financial Resource Strain     Financial Resource Strain: 0   Depression: Not at risk (3/3/2025)    PHQ-2     PHQ-2 Score: 0   Postpartum Depression: Not on file   Stress: Not on File (12/29/2022)    Received from [facility]    Stress     Stress: 0   Recent Concern: Stress - At Risk (12/29/2022)    Received from [facility]    Stress     Stress: 2   Physical Activity: Not on File (12/29/2022)    Received from [facility]    Physical Activity     Physical Activity: 0   Recent Concern: Physical Activity - At Risk (12/29/2022)    Received from [facility]    Physical Activity     Physical Activity: 2   Food Insecurity: Not on File (12/29/2022)    Received from [facility]    Food Insecurity     Food: 0   Recent Concern: Food Insecurity - At Risk (12/29/2022)    Received from [facility]    Food Insecurity     Food: 2   Transportation Needs: Not on File (12/29/2022)    Received from [facility]    Transportation Needs     Transportation: 0   Recent Concern: Transportation Needs - At Risk (12/29/2022)    Received from [facility]    Transportation Needs     Transportation: 2         OBJECTIVE  Vitals:    03/03/25 1526   BP: 120/70   Weight: 77.6 kg (171 lb)   Height: 1.549 m (5' 1\")     Body mass index is 32.31 kg/m².     Chaperone: Present: yes  Physical Exam  Constitutional:       Appearance: Normal appearance. "   Genitourinary:      Right Labia: No rash, tenderness or lesions.     Left Labia: lesions (small cluster of vesicles on left inferior labia minora).      Left Labia: No tenderness or rash.        Abdominal:      General: Abdomen is flat. There is no distension.      Tenderness: There is no abdominal tenderness.   Neurological:      Mental Status: She is alert.        ASSESSMENT & PLAN  Problem List Items Addressed This Visit    None  Visit Diagnoses       HSV infection    -  Primary    Relevant Medications    valACYclovir (Valtrex) 1 gram tablet    Recurrent HSV (herpes simplex virus)        Relevant Medications    valACYclovir (Valtrex) 1 gram tablet          Follow up: Will treat acute infection. Suppression with 1g daily given more than 10 outbreaks per year.    Homer Orourke MD  Obstetrics & Gynecology  03/03/25

## 2025-03-10 ENCOUNTER — APPOINTMENT (OUTPATIENT)
Dept: OBSTETRICS AND GYNECOLOGY | Facility: CLINIC | Age: 30
End: 2025-03-10
Payer: COMMERCIAL

## 2025-04-09 ENCOUNTER — TELEPHONE (OUTPATIENT)
Dept: OBSTETRICS AND GYNECOLOGY | Facility: CLINIC | Age: 30
End: 2025-04-09
Payer: COMMERCIAL

## 2025-04-10 NOTE — TELEPHONE ENCOUNTER
LM on pt's VM requesting return call to office.    Pt last seen 3/3/25.   Pt was also seen 1/14 for vaginal odor-vaginitis panel from OV showed intermediate results.   Pt was seen 12/23-vaginitis panel from OV +BV.

## 2025-04-11 DIAGNOSIS — N76.0 ACUTE VAGINITIS: ICD-10-CM

## 2025-04-11 RX ORDER — METRONIDAZOLE 7.5 MG/G
GEL VAGINAL DAILY
Qty: 70 G | Refills: 0 | Status: SHIPPED | OUTPATIENT
Start: 2025-04-11 | End: 2025-04-16

## 2025-04-28 ENCOUNTER — APPOINTMENT (OUTPATIENT)
Dept: OBSTETRICS AND GYNECOLOGY | Facility: CLINIC | Age: 30
End: 2025-04-28
Payer: COMMERCIAL

## 2025-05-20 ENCOUNTER — APPOINTMENT (OUTPATIENT)
Dept: OBSTETRICS AND GYNECOLOGY | Facility: CLINIC | Age: 30
End: 2025-05-20
Payer: COMMERCIAL

## 2025-07-17 ENCOUNTER — PATIENT MESSAGE (OUTPATIENT)
Dept: OBSTETRICS AND GYNECOLOGY | Facility: CLINIC | Age: 30
End: 2025-07-17
Payer: COMMERCIAL

## 2025-07-17 DIAGNOSIS — N89.8 VAGINAL ODOR: ICD-10-CM

## 2025-07-17 DIAGNOSIS — N89.8 VAGINAL DISCHARGE: ICD-10-CM

## 2025-07-17 RX ORDER — METRONIDAZOLE 7.5 MG/G
GEL VAGINAL DAILY
Qty: 70 G | Refills: 0 | Status: SHIPPED | OUTPATIENT
Start: 2025-07-17 | End: 2025-07-22